# Patient Record
Sex: MALE | Race: BLACK OR AFRICAN AMERICAN | Employment: FULL TIME | URBAN - METROPOLITAN AREA
[De-identification: names, ages, dates, MRNs, and addresses within clinical notes are randomized per-mention and may not be internally consistent; named-entity substitution may affect disease eponyms.]

---

## 2017-10-16 ENCOUNTER — OFFICE VISIT (OUTPATIENT)
Dept: FAMILY MEDICINE CLINIC | Facility: CLINIC | Age: 57
End: 2017-10-16

## 2017-10-16 VITALS
BODY MASS INDEX: 27.63 KG/M2 | SYSTOLIC BLOOD PRESSURE: 120 MMHG | HEIGHT: 72 IN | WEIGHT: 204 LBS | RESPIRATION RATE: 16 BRPM | DIASTOLIC BLOOD PRESSURE: 72 MMHG | TEMPERATURE: 98 F | HEART RATE: 66 BPM

## 2017-10-16 DIAGNOSIS — Z12.5 SPECIAL SCREENING FOR MALIGNANT NEOPLASM OF PROSTATE: ICD-10-CM

## 2017-10-16 DIAGNOSIS — Z00.00 LABORATORY EXAMINATION ORDERED AS PART OF A ROUTINE GENERAL MEDICAL EXAMINATION: ICD-10-CM

## 2017-10-16 DIAGNOSIS — Z00.00 WELLNESS EXAMINATION: Primary | ICD-10-CM

## 2017-10-16 DIAGNOSIS — Z23 NEED FOR IMMUNIZATION AGAINST INFLUENZA: ICD-10-CM

## 2017-10-16 PROCEDURE — 90471 IMMUNIZATION ADMIN: CPT | Performed by: FAMILY MEDICINE

## 2017-10-16 PROCEDURE — 90686 IIV4 VACC NO PRSV 0.5 ML IM: CPT | Performed by: FAMILY MEDICINE

## 2017-10-16 PROCEDURE — 99396 PREV VISIT EST AGE 40-64: CPT | Performed by: FAMILY MEDICINE

## 2017-10-16 NOTE — PATIENT INSTRUCTIONS
Thank you for choosing Geovanny Lewis MD at [place] [address]  To Do: Panchito Chavez  1. F/U in 1 yr for next physical or as needed for any health concerns  2. High Blood pressure  What Is a Normal Blood Pressure?   [employer] o from the aorta, the large blood vessel that supplies blood to the abdomen, pelvis, and legs   Heart failure   Poor blood supply to the legs  Erectile Dysfunction  Problems with your vision    Overview  \"Blood pressure\" is the force of blood pushing again and much less sodium (salt) than the typical American diet. Limit sodium to no more than 2,300 mg a day (eating only 1,500 mg a day is an even better goal).    Reduce saturated fat to no more than 6% of daily calories and total fat to 27% of daily calories vegetables only as side dishes — a hearty blend of vegetables served over brown rice or whole-wheat noodles can serve as the main dish for a meal.   • Fresh or frozen vegetables are both good choices.  When buying frozen and canned vegetables, choose those that contains the enzyme lactase, which can reduce or prevent the symptoms of lactose intolerance. • Go easy on regular and even fat-free cheeses because they are typically high in sodium.    Lean meat, poultry and fish (6 or fewer servings a day)  Meat c natural plant compound (phytochemical) that has been shown to have some health benefits. Fats and oils (2 to 3 servings a day)  Fat helps your body absorb essential vitamins and helps your body's immune system.  But too much fat increases your risk of hea on added sugar, which has no nutritional value but can pack on calories. DASH diet: Alcohol and caffeine  Drinking too much alcohol can increase blood pressure.  The DASH diet recommends that men limit alcohol to two or fewer drinks a day and women one or taste when you choose low-sodium food and beverages.  If things seem too bland, gradually introduce low-sodium foods and cut back on table salt until you reach your sodium goal. That'll give your palate time to adjust. It can take several weeks for your tas safety, read the entire package insert of all medicines prescribed to you and be aware of all of the risks of treatment even beyond those discussed today.  All therapies have potential risk of harm or side effects or medication interactions.  It is your du

## 2017-10-16 NOTE — H&P
[address] [place] [address]    History and Physical    Juan Antonio Gobble Pako-Irasema Patient Status:  No patient class for patient encounter    1960 MRN [id number]   Location [address] [place] [address], [address] [facility] , [facility] Attending No fatigue, fever and unexpected weight change. HENT: Negative for hearing loss and sore throat. Eyes: Negative for pain and visual disturbance. Respiratory: Negative for cough, chest tightness, shortness of breath and wheezing.     Cardiovascular: Nega He has no wheezes. He has no rales. He exhibits no tenderness. Abdominal: Soft. Bowel sounds are normal. He exhibits no distension and no mass. There is no hepatosplenomegaly. There is no tenderness. There is no rebound and no guarding. No hernia.    Musc

## 2017-10-17 NOTE — PROGRESS NOTES
Vit D level mildly low. May take Vit D 1000 mcg daily. Otherwise , labs with no concerning values.  Please notify patient.    -Dr. Tc Santiago

## 2017-10-24 ENCOUNTER — OFFICE VISIT (OUTPATIENT)
Dept: FAMILY MEDICINE CLINIC | Facility: CLINIC | Age: 57
End: 2017-10-24

## 2017-10-24 VITALS
SYSTOLIC BLOOD PRESSURE: 128 MMHG | DIASTOLIC BLOOD PRESSURE: 74 MMHG | WEIGHT: 206 LBS | RESPIRATION RATE: 18 BRPM | HEART RATE: 94 BPM | TEMPERATURE: 99 F | BODY MASS INDEX: 27.9 KG/M2 | HEIGHT: 72 IN | OXYGEN SATURATION: 94 %

## 2017-10-24 DIAGNOSIS — J40 BRONCHITIS: Primary | ICD-10-CM

## 2017-10-24 PROCEDURE — 99213 OFFICE O/P EST LOW 20 MIN: CPT | Performed by: EMERGENCY MEDICINE

## 2017-10-24 RX ORDER — ALBUTEROL SULFATE 90 UG/1
1-2 AEROSOL, METERED RESPIRATORY (INHALATION) EVERY 4 HOURS PRN
Qty: 1 INHALER | Refills: 1 | Status: SHIPPED | OUTPATIENT
Start: 2017-10-24 | End: 2018-02-08 | Stop reason: ALTCHOICE

## 2017-10-24 RX ORDER — CODEINE PHOSPHATE AND GUAIFENESIN 10; 100 MG/5ML; MG/5ML
10 SOLUTION ORAL 3 TIMES DAILY PRN
Qty: 250 ML | Refills: 0 | Status: SHIPPED
Start: 2017-10-24 | End: 2018-02-08 | Stop reason: ALTCHOICE

## 2017-10-24 RX ORDER — AZITHROMYCIN 250 MG/1
TABLET, FILM COATED ORAL
Qty: 1 PACKAGE | Refills: 0 | Status: SHIPPED | OUTPATIENT
Start: 2017-10-24 | End: 2017-10-25

## 2017-10-24 NOTE — PROGRESS NOTES
Chief Complaint:   Patient presents with:  Cough: mix cough w/yellowish mucus, chills, loss of appetite. For 1 week     HPI:   This is a 61year old male     COUGH  Congh and congestion for the past 5 days. Cough dry and productive of scany phlegm.  + runny sclera non icteric bilateral  NECK: supple, no adenopathy, no thyromegaly  LUNGS: clear to auscultation, no RRW  CARDIO: RRR without murmur  EXTREMITIES: no cyanosis, clubbing or edema no calf swelling or tenderness      ASSESSMENT AND PLAN:   Diagnoses an

## 2017-10-24 NOTE — PATIENT INSTRUCTIONS
Thank you for choosing [facility]  To Do:  Lima Catherine  Follow-up with your primary care physician in the next 1 week if not better.   Go to the ER if there is any worsening of symptoms  Start antibiotics and use a albuterol inhaler

## 2017-10-25 ENCOUNTER — TELEPHONE (OUTPATIENT)
Dept: FAMILY MEDICINE CLINIC | Facility: CLINIC | Age: 57
End: 2017-10-25

## 2017-10-25 RX ORDER — AZITHROMYCIN 250 MG/1
TABLET, FILM COATED ORAL
Qty: 1 PACKAGE | Refills: 0 | Status: SHIPPED | OUTPATIENT
Start: 2017-10-25 | End: 2018-02-08 | Stop reason: ALTCHOICE

## 2017-10-25 NOTE — TELEPHONE ENCOUNTER
Spoke with pt. Pt states that he has misplaced his abt and is very concerned because it is now time for him to take his next dose. Pt states that he would like a new Rx sent to the pharmacy on file as he has no idea where the medication is.    Please advise

## 2017-10-25 NOTE — TELEPHONE ENCOUNTER
Pharmacy calling patient lost his medication for azithromycin (ZITHROMAX Z-AGNIESZKA) 250 MG Oral Tab. He is worried about missing dosage.  Please advise,

## 2018-02-08 ENCOUNTER — OFFICE VISIT (OUTPATIENT)
Dept: FAMILY MEDICINE CLINIC | Facility: CLINIC | Age: 58
End: 2018-02-08

## 2018-02-08 VITALS
SYSTOLIC BLOOD PRESSURE: 102 MMHG | HEART RATE: 63 BPM | RESPIRATION RATE: 20 BRPM | BODY MASS INDEX: 28 KG/M2 | WEIGHT: 208 LBS | DIASTOLIC BLOOD PRESSURE: 60 MMHG | OXYGEN SATURATION: 100 % | TEMPERATURE: 98 F

## 2018-02-08 DIAGNOSIS — M25.511 ACUTE PAIN OF RIGHT SHOULDER: Primary | ICD-10-CM

## 2018-02-08 PROCEDURE — 99212 OFFICE O/P EST SF 10 MIN: CPT | Performed by: PHYSICIAN ASSISTANT

## 2018-02-08 NOTE — PATIENT INSTRUCTIONS
-YOU HAVE BEEN CLEARLY ADVISED TO GO TO HIGHER LEVEL OF CARE INCLUDING ER/ED. CONCERNED FEELING FEVERISH FOR 5 DAYS AND WITH SHOULDER PAIN.   UNABLE TO WORK UP FURTHER IN WIC. YOU ARE AWARE [facility] UNABLE TO WORK UP POSSIBLE UNDERLYING HEART/LUNG CONDITIONS, U · Shoulder joints become stiff if left in a sling for too long. You should start range of motion exercises about 7 to 10 days after the injury. Talk with your provider to find out what type of exercises to do and how soon to start.   Follow-up care  Follow

## 2018-02-08 NOTE — PROGRESS NOTES
S:  Patient reports to clinic for subjective fevers and right shoulder pain. Patient reports he has felt \"feverish\" x 5 days, although has not taken his temperature at home. Patient feels feverish in the mornings and at night.   Patient denies URI s A sprain is a stretching or tearing of the ligaments that hold a joint together. A sprain may take up to 8 weeks to fully heal, depending on how severe it is. Moderate to severe shoulder sprains are treated with a sling or shoulder immobilizer.  Minor sprai Any X-rays you had today don’t show any broken bones, breaks, or fractures. Sometimes fractures don’t show up on the first X-ray. Bruises and sprains can sometimes hurt as much as a fracture. These injuries can take time to heal completely.  If your symptom

## 2018-05-08 ENCOUNTER — OFFICE VISIT (OUTPATIENT)
Dept: FAMILY MEDICINE CLINIC | Facility: CLINIC | Age: 58
End: 2018-05-08

## 2018-05-08 VITALS
BODY MASS INDEX: 27.63 KG/M2 | SYSTOLIC BLOOD PRESSURE: 118 MMHG | RESPIRATION RATE: 16 BRPM | WEIGHT: 204 LBS | DIASTOLIC BLOOD PRESSURE: 70 MMHG | HEIGHT: 72 IN | TEMPERATURE: 98 F | HEART RATE: 60 BPM

## 2018-05-08 DIAGNOSIS — J01.90 ACUTE NON-RECURRENT SINUSITIS, UNSPECIFIED LOCATION: Primary | ICD-10-CM

## 2018-05-08 PROCEDURE — 99213 OFFICE O/P EST LOW 20 MIN: CPT | Performed by: FAMILY MEDICINE

## 2018-05-08 RX ORDER — AMOXICILLIN 875 MG/1
875 TABLET, COATED ORAL 2 TIMES DAILY
Qty: 20 TABLET | Refills: 0 | Status: SHIPPED | OUTPATIENT
Start: 2018-05-08 | End: 2018-05-18

## 2018-05-08 NOTE — PATIENT INSTRUCTIONS
Thank you for choosing Connie Yarbrough MD at [place] [address]  To Do: Panchito Chavez  1. Please take meds as directed  [facility] is located in [address]. Monday, Tuesday & Friday – 8 a.m. to 4 p.m.   Wednesday, Thursday – 7 a.m. to 3 outweigh those potential risks and we strive to make you healthier and to improve your quality of life.     Referrals, and Radiology Information:    If your insurance requires a referral to a specialist, please allow 5 business days to process your referral

## 2018-05-08 NOTE — PROGRESS NOTES
HPI:    Patient ID: Olivia Ruiz is a 61year old male. HPI  Mr. Wilmer Bennett is a pleasant 62 y/o M who has been generally healthy here today for ringing of both ears after he got back from his trip from [place].   He had started symptoms from la normal. No respiratory distress. He has no wheezes. He has no rales. Abdominal: Soft. Bowel sounds are normal. He exhibits no distension. There is no tenderness. Musculoskeletal: He exhibits no edema.    Lymphadenopathy:     He has no cervical adenopath

## 2018-07-31 ENCOUNTER — OFFICE VISIT (OUTPATIENT)
Dept: FAMILY MEDICINE CLINIC | Facility: CLINIC | Age: 58
End: 2018-07-31
Payer: COMMERCIAL

## 2018-07-31 VITALS
BODY MASS INDEX: 27.5 KG/M2 | SYSTOLIC BLOOD PRESSURE: 116 MMHG | HEART RATE: 58 BPM | TEMPERATURE: 98 F | DIASTOLIC BLOOD PRESSURE: 72 MMHG | HEIGHT: 72 IN | WEIGHT: 203 LBS | RESPIRATION RATE: 16 BRPM

## 2018-07-31 DIAGNOSIS — J01.10 ACUTE NON-RECURRENT FRONTAL SINUSITIS: Primary | ICD-10-CM

## 2018-07-31 PROCEDURE — 99213 OFFICE O/P EST LOW 20 MIN: CPT | Performed by: FAMILY MEDICINE

## 2018-07-31 RX ORDER — AMOXICILLIN 500 MG/1
500 CAPSULE ORAL 3 TIMES DAILY
Qty: 30 CAPSULE | Refills: 0 | Status: SHIPPED | OUTPATIENT
Start: 2018-07-31 | End: 2018-08-10

## 2018-07-31 NOTE — PROGRESS NOTES
HPI:    Patient ID: Olivia Ruiz is a 61year old male.     HPI  Mr. Wilmer Bennett is a pleasant 60 y/o M who has been generally healthy who came back yesterday from [facility] after visiting her mother for her birthday over the weekend and has been having rig murmur heard. Pulmonary/Chest: Effort normal and breath sounds normal. No respiratory distress. He has no wheezes. He has no rales. Abdominal: Soft. Bowel sounds are normal. He exhibits no distension. There is no tenderness.    Musculoskeletal: He exhibi

## 2018-07-31 NOTE — PATIENT INSTRUCTIONS
Thank you for choosing Cheng Pineda MD at [place] [address]  To Do: Panchito Chavez  1. Please take meds as directed.,    [facility] is located in [address]. Monday, Tuesday & Friday – 8 a.m. to 4 p.m.   Wednesday, Thursday – 7 a.m. t outweigh those potential risks and we strive to make you healthier and to improve your quality of life.     Referrals, and Radiology Information:    If your insurance requires a referral to a specialist, please allow 5 business days to process your referral

## 2018-11-14 ENCOUNTER — OFFICE VISIT (OUTPATIENT)
Dept: FAMILY MEDICINE CLINIC | Facility: CLINIC | Age: 58
End: 2018-11-14
Payer: COMMERCIAL

## 2018-11-14 VITALS
RESPIRATION RATE: 18 BRPM | HEART RATE: 74 BPM | BODY MASS INDEX: 27.63 KG/M2 | WEIGHT: 204 LBS | HEIGHT: 72 IN | DIASTOLIC BLOOD PRESSURE: 80 MMHG | SYSTOLIC BLOOD PRESSURE: 110 MMHG | TEMPERATURE: 98 F

## 2018-11-14 DIAGNOSIS — J20.9 ACUTE BRONCHITIS, UNSPECIFIED ORGANISM: ICD-10-CM

## 2018-11-14 DIAGNOSIS — J01.90 ACUTE NON-RECURRENT SINUSITIS, UNSPECIFIED LOCATION: Primary | ICD-10-CM

## 2018-11-14 PROCEDURE — 99214 OFFICE O/P EST MOD 30 MIN: CPT | Performed by: FAMILY MEDICINE

## 2018-11-14 RX ORDER — AMOXICILLIN AND CLAVULANATE POTASSIUM 875; 125 MG/1; MG/1
1 TABLET, FILM COATED ORAL 2 TIMES DAILY
Qty: 20 TABLET | Refills: 0 | Status: SHIPPED | OUTPATIENT
Start: 2018-11-14 | End: 2018-12-27

## 2018-11-14 RX ORDER — METHYLPREDNISOLONE 4 MG/1
TABLET ORAL
Qty: 1 KIT | Refills: 0 | Status: SHIPPED | OUTPATIENT
Start: 2018-11-14 | End: 2018-11-20

## 2018-11-14 NOTE — PROGRESS NOTES
HPI:    Patient ID: Clara Eduardo is a 60year old male.     HPI  Mr. Garrison Ratliff is a pleasant 58-year-old male who was been generally healthy who recently had come back from [facility] and [place] for a leisure trip presenting today with cou no mass. There is no tenderness. Musculoskeletal: He exhibits no edema. Lymphadenopathy:     He has no cervical adenopathy. Neurological: He is alert. Psychiatric: He has a normal mood and affect. His behavior is normal.   Vitals reviewed.

## 2018-11-14 NOTE — PATIENT INSTRUCTIONS
Thank you for choosing Nicol Rubi MD at [place] [address]  To Do: Panchito Chavez  1. Please take meds as directed. [facility] is located in [address]. Monday, Tuesday & Friday – 8 a.m. to 4 p.m.   Wednesday, Thursday – 7 a.m. to outweigh those potential risks and we strive to make you healthier and to improve your quality of life.     Referrals, and Radiology Information:    If your insurance requires a referral to a specialist, please allow 5 business days to process your referral

## 2018-11-19 ENCOUNTER — TELEPHONE (OUTPATIENT)
Dept: FAMILY MEDICINE CLINIC | Facility: CLINIC | Age: 58
End: 2018-11-19

## 2018-11-19 NOTE — TELEPHONE ENCOUNTER
Patient will need note for work from last week and another one for this week. He is still coughing a lot and does not feel any better. Patient will come and  the notes when they are complete.

## 2018-11-19 NOTE — TELEPHONE ENCOUNTER
PT states he is still coughing a lot of flem, and he is very congested. Advised pt he should be seen to be re evaluated as he is not feeling better. Appr scheduled. Has not been to work since last Tuesday. Paula Padilla He usually works Tues-Fri.  Pt would like a no

## 2018-11-20 ENCOUNTER — OFFICE VISIT (OUTPATIENT)
Dept: FAMILY MEDICINE CLINIC | Facility: CLINIC | Age: 58
End: 2018-11-20
Payer: COMMERCIAL

## 2018-11-20 VITALS
DIASTOLIC BLOOD PRESSURE: 76 MMHG | BODY MASS INDEX: 27.63 KG/M2 | RESPIRATION RATE: 16 BRPM | SYSTOLIC BLOOD PRESSURE: 114 MMHG | HEIGHT: 72 IN | TEMPERATURE: 98 F | WEIGHT: 204 LBS | HEART RATE: 70 BPM

## 2018-11-20 DIAGNOSIS — R05.9 COUGH: Primary | ICD-10-CM

## 2018-11-20 PROCEDURE — 99213 OFFICE O/P EST LOW 20 MIN: CPT | Performed by: FAMILY MEDICINE

## 2018-11-20 RX ORDER — PROMETHAZINE HYDROCHLORIDE AND CODEINE PHOSPHATE 6.25; 1 MG/5ML; MG/5ML
2.5 SYRUP ORAL EVERY 4 HOURS PRN
Qty: 118 ML | Refills: 0 | Status: SHIPPED | OUTPATIENT
Start: 2018-11-20 | End: 2018-12-27

## 2018-11-20 NOTE — PATIENT INSTRUCTIONS
Thank you for choosing Trev Hickey MD at [facility]  To Do: Panchito Chavez  1. Please take meds as directed. [facility] is located in [address]. Monday, Tuesday & Friday – 8 a.m. to 4 p.m.   Wednesday, Thursday – 7 a.m. to 3 outweigh those potential risks and we strive to make you healthier and to improve your quality of life.     Referrals, and Radiology Information:    If your insurance requires a referral to a specialist, please allow 5 business days to process your referral

## 2018-11-20 NOTE — PROGRESS NOTES
HPI:    Patient ID: Brianne Connelly is a 60year old male. HPI  Mr. Van Davis is a pleasant 53-year-old male who I saw last week for cough secondary to acute sinusitis and bronchitis.   He had recently gone back from a trip from [facility] and Conjunctivae are normal. No scleral icterus. Neck: Neck supple. No thyromegaly present. Cardiovascular: Normal rate, regular rhythm and normal heart sounds. No murmur heard.   Pulmonary/Chest: Effort normal and breath sounds normal. No respiratory dis

## 2018-12-26 ENCOUNTER — TELEPHONE (OUTPATIENT)
Dept: FAMILY MEDICINE CLINIC | Facility: CLINIC | Age: 58
End: 2018-12-26

## 2018-12-26 NOTE — TELEPHONE ENCOUNTER
Pt states he is still having cough. Pt informed he needs to come in to get re-evaluated. appt offered for tomorrow morning with  at 745am. Pt is ok with appt.         Future Appointments   Date Time Provider Lima Cuello   12/27/2018  8:0

## 2018-12-27 ENCOUNTER — OFFICE VISIT (OUTPATIENT)
Dept: FAMILY MEDICINE CLINIC | Facility: CLINIC | Age: 58
End: 2018-12-27
Payer: COMMERCIAL

## 2018-12-27 VITALS
RESPIRATION RATE: 16 BRPM | HEIGHT: 72 IN | BODY MASS INDEX: 27.63 KG/M2 | TEMPERATURE: 100 F | WEIGHT: 204 LBS | SYSTOLIC BLOOD PRESSURE: 120 MMHG | DIASTOLIC BLOOD PRESSURE: 80 MMHG | HEART RATE: 80 BPM

## 2018-12-27 DIAGNOSIS — J01.91 ACUTE RECURRENT SINUSITIS, UNSPECIFIED LOCATION: Primary | ICD-10-CM

## 2018-12-27 PROCEDURE — 99213 OFFICE O/P EST LOW 20 MIN: CPT | Performed by: FAMILY MEDICINE

## 2018-12-27 RX ORDER — AMOXICILLIN AND CLAVULANATE POTASSIUM 875; 125 MG/1; MG/1
1 TABLET, FILM COATED ORAL 2 TIMES DAILY
Qty: 20 TABLET | Refills: 0 | Status: SHIPPED | OUTPATIENT
Start: 2018-12-27 | End: 2019-01-06

## 2018-12-27 RX ORDER — PROMETHAZINE HYDROCHLORIDE AND CODEINE PHOSPHATE 6.25; 1 MG/5ML; MG/5ML
2.5 SYRUP ORAL EVERY 4 HOURS PRN
Qty: 118 ML | Refills: 0 | Status: SHIPPED | OUTPATIENT
Start: 2018-12-27 | End: 2019-01-14 | Stop reason: ALTCHOICE

## 2018-12-27 NOTE — PROGRESS NOTES
HPI:    Patient ID: Cedrick Santos is a 60year old male.     HPI  Mr. Vishnu Greenfield is a pleasant 60year-old male  here today for cough which started about 4 days ago which is likely productive of phlegm associated with fever, headaches, congestio scleral icterus. Neck: Neck supple. No thyromegaly present. Cardiovascular: Normal rate, regular rhythm and normal heart sounds. No murmur heard. Pulmonary/Chest: Effort normal and breath sounds normal. No respiratory distress. He has no wheezes.  He

## 2018-12-27 NOTE — PATIENT INSTRUCTIONS
Thank you for choosing Valentina Martínez MD at [place] [address]  To Do: Panchito Chavez  1. Please take meds as directed. [facility] is located in [address]. Monday, Tuesday & Friday – 8 a.m. to 4 p.m.   Wednesday, Thursday – 7 a.m. to outweigh those potential risks and we strive to make you healthier and to improve your quality of life.     Referrals, and Radiology Information:    If your insurance requires a referral to a specialist, please allow 5 business days to process your referral

## 2019-01-14 ENCOUNTER — OFFICE VISIT (OUTPATIENT)
Dept: FAMILY MEDICINE CLINIC | Facility: CLINIC | Age: 59
End: 2019-01-14
Payer: COMMERCIAL

## 2019-01-14 VITALS
BODY MASS INDEX: 27.9 KG/M2 | SYSTOLIC BLOOD PRESSURE: 116 MMHG | RESPIRATION RATE: 16 BRPM | HEIGHT: 72 IN | HEART RATE: 66 BPM | TEMPERATURE: 98 F | DIASTOLIC BLOOD PRESSURE: 78 MMHG | WEIGHT: 206 LBS

## 2019-01-14 DIAGNOSIS — Z13.228 SCREENING FOR ENDOCRINE, METABOLIC AND IMMUNITY DISORDER: ICD-10-CM

## 2019-01-14 DIAGNOSIS — Z13.29 SCREENING FOR ENDOCRINE, METABOLIC AND IMMUNITY DISORDER: ICD-10-CM

## 2019-01-14 DIAGNOSIS — Z00.00 WELLNESS EXAMINATION: Primary | ICD-10-CM

## 2019-01-14 DIAGNOSIS — Z13.0 SCREENING FOR ENDOCRINE, METABOLIC AND IMMUNITY DISORDER: ICD-10-CM

## 2019-01-14 PROCEDURE — 99396 PREV VISIT EST AGE 40-64: CPT | Performed by: FAMILY MEDICINE

## 2019-01-14 NOTE — PROGRESS NOTES
Wellness Exam    REASON FOR VISIT:    Amilcar Merida is a 60year old male who presents for an [address]     Current Complaints: Mr. Elli Marquis is a pleasant 61 y/o M here for his wellness  Flu Shot: see immunization record  Healt Colonoscopy due on 12/27/2023    Flex Sigmoidoscopy Screen  Every 5 years No results found for this or any previous visit.     Fecal Occult Blood  Annually No results found for: FOB, OCCULTSTOOL    Obesity Screening Screen all adults annually Body mass inde breath. Cardiovascular: Negative for chest pain and palpitations. Gastrointestinal: Negative for nausea, vomiting, abdominal pain and diarrhea. Genitourinary: Negative for urgency, frequency and difficulty urinating.    Musculoskeletal: Negative for diagnosis)  Screening for endocrine, metabolic and immunity disorder    PLAN SUMMARY:   Evan Platt is a 60year old male  Age appropriate cancer screening, labs, safety, immunizations were discussed with the patient and ordered as follows:  Vashti older: one dose   Varicella 2 doses if not immune   MMR 1-2 doses if born after 1957 and not immune     Patient Active Problem List:     Laboratory examination ordered as part of a routine general medical examination     Prediabetes     Hyperlipidemia

## 2019-01-14 NOTE — PATIENT INSTRUCTIONS
Thank you for choosing Radha Chand MD at [place] [address]  To Do: Panchito Chavez  1. Please see age appropriate health prevention below    [email or web address] is located in [address]. Monday, Tuesday & Friday – 8 a.m. to 4 p.m.   Wednesday, that the benefits outweigh those potential risks and we strive to make you healthier and to improve your quality of life.     Referrals, and Radiology Information:    If your insurance requires a referral to a specialist, please allow 5 business days to pro exams   Blood pressure All men in this age group Yearly checkup if your blood pressure is normal  Normal blood pressure is less than 120/80 mm Hg  If your blood pressure reading is higher than normal, follow the advice of your healthcare provider      Lei this age group Ask your healthcare provider   Vaccine Who needs it How often   Chickenpox (varicella) All men in this age group who have no record of this infection or vaccine 2 doses; second dose should be given at least 4 weeks after the first dose   Hep provider At routine exams   Use of daily aspirin Men in this age group at risk for cardiovascular health problems At routine exams   Use of tobacco and the health effects it can cause All men in this age group Every visit   [facility]

## 2019-01-15 LAB
ABSOLUTE BASOPHILS: 22 CELLS/UL (ref 0–200)
ABSOLUTE EOSINOPHILS: 163 CELLS/UL (ref 15–500)
ABSOLUTE LYMPHOCYTES: 1918 CELLS/UL (ref 850–3900)
ABSOLUTE MONOCYTES: 352 CELLS/UL (ref 200–950)
ABSOLUTE NEUTROPHILS: 1945 CELLS/UL (ref 1500–7800)
ALBUMIN/GLOBULIN RATIO: 1.6 (CALC) (ref 1–2.5)
ALBUMIN: 4.3 G/DL (ref 3.6–5.1)
ALKALINE PHOSPHATASE: 62 U/L (ref 40–115)
ALT: 18 U/L (ref 9–46)
AST: 19 U/L (ref 10–35)
BASOPHILS: 0.5 %
BILIRUBIN, TOTAL: 0.6 MG/DL (ref 0.2–1.2)
BUN: 11 MG/DL (ref 7–25)
CALCIUM: 9.5 MG/DL (ref 8.6–10.3)
CARBON DIOXIDE: 30 MMOL/L (ref 20–32)
CHLORIDE: 102 MMOL/L (ref 98–110)
CHOL/HDLC RATIO: 4.9 (CALC)
CHOLESTEROL, TOTAL: 227 MG/DL
CREATININE: 1.32 MG/DL (ref 0.7–1.33)
EGFR IF AFRICN AM: 68 ML/MIN/1.73M2
EGFR IF NONAFRICN AM: 59 ML/MIN/1.73M2
EOSINOPHILS: 3.7 %
GLOBULIN: 2.7 G/DL (CALC) (ref 1.9–3.7)
GLUCOSE: 87 MG/DL (ref 65–99)
HDL CHOLESTEROL: 46 MG/DL
HEMATOCRIT: 46.7 % (ref 38.5–50)
HEMOGLOBIN A1C: 6.2 % OF TOTAL HGB
HEMOGLOBIN: 14.7 G/DL (ref 13.2–17.1)
LDL-CHOLESTEROL: 151 MG/DL (CALC)
LYMPHOCYTES: 43.6 %
MCH: 21.2 PG (ref 27–33)
MCHC: 31.5 G/DL (ref 32–36)
MCV: 67.2 FL (ref 80–100)
MONOCYTES: 8 %
MPV: 10.1 FL (ref 7.5–12.5)
NEUTROPHILS: 44.2 %
NON-HDL CHOLESTEROL: 181 MG/DL (CALC)
PLATELET COUNT: 197 THOUSAND/UL (ref 140–400)
POTASSIUM: 4.5 MMOL/L (ref 3.5–5.3)
PROTEIN, TOTAL: 7 G/DL (ref 6.1–8.1)
PSA, TOTAL: 1.3 NG/ML
RDW: 18.9 % (ref 11–15)
RED BLOOD CELL COUNT: 6.95 MILLION/UL (ref 4.2–5.8)
SODIUM: 138 MMOL/L (ref 135–146)
TRIGLYCERIDES: 163 MG/DL
TSH W/REFLEX TO FT4: 1.88 MIU/L (ref 0.4–4.5)
VITAMIN B12: 565 PG/ML (ref 200–1100)
VITAMIN D, 25-OH, TOTAL: 26 NG/ML (ref 30–100)
WHITE BLOOD CELL COUNT: 4.4 THOUSAND/UL (ref 3.8–10.8)

## 2019-01-15 NOTE — PROGRESS NOTES
LDL and total cholesterol elevated than previous. Recommend lifestyle modifications including low salt, low fat, and low carbohydrate diet.  Encourage aerobic exercises 4-5 times/week for 20-30 minutes per session    -Dr. Sandy Foley

## 2020-03-12 ENCOUNTER — TELEPHONE (OUTPATIENT)
Dept: FAMILY MEDICINE CLINIC | Facility: CLINIC | Age: 60
End: 2020-03-12

## 2020-03-12 NOTE — TELEPHONE ENCOUNTER
Patient called to schedule annual exam. Patient asked screening questions while answering he mentioned he traveled to [place], was there for 8 days returned on Monday. Patient has no symptoms at this time.  Please advise

## 2020-03-12 NOTE — TELEPHONE ENCOUNTER
Spoke with pt, advised pt since he is scheduling for his annual exam and with recent travel to wait and schedule his appt for after 14 days. Pt recently returned from [place] on 3/9/2020. Pt denies any symptoms.  Advised pt if he develops any symptoms (SOB,

## 2020-04-22 ENCOUNTER — VIRTUAL PHONE E/M (OUTPATIENT)
Dept: FAMILY MEDICINE CLINIC | Facility: CLINIC | Age: 60
End: 2020-04-22
Payer: COMMERCIAL

## 2020-04-22 DIAGNOSIS — R05.9 COUGH: Primary | ICD-10-CM

## 2020-04-22 DIAGNOSIS — Z20.822 EXPOSURE TO COVID-19 VIRUS: ICD-10-CM

## 2020-04-22 DIAGNOSIS — R50.9 FEVER, UNSPECIFIED FEVER CAUSE: ICD-10-CM

## 2020-04-22 PROCEDURE — 99213 OFFICE O/P EST LOW 20 MIN: CPT | Performed by: PHYSICIAN ASSISTANT

## 2020-04-22 NOTE — PATIENT INSTRUCTIONS
Thank you for choosing Dylan Alfaro PA-C at [facility]  To Do: Panchito Chavez  1.  Push fluids, OTC acetaminophen  What to do if you are sick with coronavirus disease 2019 (COVID-19)  If you are sick with COVID-19 or suspect you are in they feel dry. Soap and water should be used preferentially if hands are visibly dirty. Avoid sharing personal household items  You should not share dishes, drink glasses, cups, eating utensils, towels, or bedding with other people or pets in your home.  A provided by their local health department or occupational health professionals, as appropriate. If you have a medical emergency and need to call 911, notify the dispatch personnel that you have, or are being evaluated for COVID-19.  If possible, put on a f wipes according to the label instructions. Centers for Disease Control & Prevention (CDC)  What to do if you are sick with coronavirus disease 2019, [employer]pt. pdf  [facility] request.    If Lisa Saldivar PA-C orders a CT or MRI, it may take up to 10 business days to receive approval from your insurance company.      Once our office has called informing you that the insurance company approved your testing, please call Central

## 2020-04-22 NOTE — PROGRESS NOTES
Virtual Telephone Check-In    Cedrick Santos verbally consents to a Virtual/Telephone Check-In visit on 04/22/20.     Patient understands and accepts financial responsibility for any deductible, co-insurance and/or co-pays associated with this servic myalgias. Neurological: Positive for headaches. Negative for dizziness and light-headedness. There were no vitals taken for this visit. There is no height or weight on file to calculate BMI.   Physical Exam   Constitutional: He is oriented to pers facemask  You should wear a facemask when you are around other people (e.g., sharing a room or vehicle) or pets and before you enter a healthcare provider's office.  If you are not able to wear a facemask (for example, because it causes trouble breathing), including precautions you should take when applying the product, such as wearing gloves and making sure you have good ventilation during use of the product.   Monitor your symptoms  Seek prompt medical attention if your illness is worsening (e.g., difficult COVID-19.  5. For medical emergencies, call 911 and notify the dispatch personnel that you have or may have COVID-19.  6. Cover your cough and sneezes.   7. Wash your hands often with soap and water for at least 20 seconds or clean your hands with an alcoho

## 2020-04-23 ENCOUNTER — LAB ENCOUNTER (OUTPATIENT)
Dept: LAB | Facility: HOSPITAL | Age: 60
End: 2020-04-23
Attending: PHYSICIAN ASSISTANT
Payer: COMMERCIAL

## 2020-04-23 DIAGNOSIS — Z20.822 EXPOSURE TO COVID-19 VIRUS: ICD-10-CM

## 2020-04-23 DIAGNOSIS — R50.9 FEVER, UNSPECIFIED FEVER CAUSE: ICD-10-CM

## 2020-04-23 DIAGNOSIS — R05.9 COUGH: ICD-10-CM

## 2020-05-05 ENCOUNTER — VIRTUAL PHONE E/M (OUTPATIENT)
Dept: FAMILY MEDICINE CLINIC | Facility: CLINIC | Age: 60
End: 2020-05-05
Payer: COMMERCIAL

## 2020-05-05 DIAGNOSIS — R50.9 FEVER, UNSPECIFIED FEVER CAUSE: Primary | ICD-10-CM

## 2020-05-05 PROCEDURE — 99213 OFFICE O/P EST LOW 20 MIN: CPT | Performed by: PHYSICIAN ASSISTANT

## 2020-05-05 NOTE — PATIENT INSTRUCTIONS
Thank you for choosing Rosaura Vera PA-C at [place] [address]  To Do: Panchito hCavez  1. Push fluids  2.  Follow-up if symptoms persist or increase  Quarantine Advice:  • Those with mild symptoms, regardless of test results, are presumed to h you should not stay in the same room with you, or they should wear a facemask if they enter your room. Cover your coughs and sneezes  Cover your mouth and nose with a tissue when you cough or sneeze.  Throw used tissues in a lined trash can; immediately wa care, call your healthcare provider and tell them that you have, or are being evaluated for, COVID-19. Put on a facemask before you enter the facility.  These steps will help the healthcare provider's office to keep other people in the office or waiting marti hand  that contains at least 60% alcohol. • As much as possible, stay in a specific room and away from other people in your home. Also, you should use a separate bathroom, if available.  If you need to be around other people or outside of the home effects or medication interactions.  It is your duty and for your safety to discuss with the pharmacist and our office with questions, and to notify us and stop treatment if problems arise, but know that our intention is that the benefits outweigh those po

## 2020-05-05 NOTE — PROGRESS NOTES
Virtual Telephone Check-In    Mio Hartley verbally consents to a Virtual/Telephone Check-In visit on 05/05/20.     Patient understands and accepts financial responsibility for any deductible, co-insurance and/or co-pays associated with this servic Pulmonary/Chest:   No increased effort or work with breathing   Neurological: He is alert and oriented to person, place, and time.    Speaking fluently and in clear sentences         Assessment and Plan:  Fever, unspecified fever cause  (primary encounter

## 2020-11-02 ENCOUNTER — OFFICE VISIT (OUTPATIENT)
Dept: FAMILY MEDICINE CLINIC | Facility: CLINIC | Age: 60
End: 2020-11-02
Payer: COMMERCIAL

## 2020-11-02 VITALS
HEART RATE: 54 BPM | OXYGEN SATURATION: 99 % | SYSTOLIC BLOOD PRESSURE: 112 MMHG | BODY MASS INDEX: 27.9 KG/M2 | DIASTOLIC BLOOD PRESSURE: 70 MMHG | TEMPERATURE: 97 F | HEIGHT: 72 IN | WEIGHT: 206 LBS | RESPIRATION RATE: 14 BRPM

## 2020-11-02 DIAGNOSIS — Z12.5 SCREENING FOR MALIGNANT NEOPLASM OF PROSTATE: ICD-10-CM

## 2020-11-02 DIAGNOSIS — Z23 NEED FOR VACCINATION: ICD-10-CM

## 2020-11-02 DIAGNOSIS — Z00.00 WELLNESS EXAMINATION: Primary | ICD-10-CM

## 2020-11-02 PROCEDURE — 3078F DIAST BP <80 MM HG: CPT | Performed by: FAMILY MEDICINE

## 2020-11-02 PROCEDURE — 99396 PREV VISIT EST AGE 40-64: CPT | Performed by: FAMILY MEDICINE

## 2020-11-02 PROCEDURE — 3074F SYST BP LT 130 MM HG: CPT | Performed by: FAMILY MEDICINE

## 2020-11-02 PROCEDURE — 3008F BODY MASS INDEX DOCD: CPT | Performed by: FAMILY MEDICINE

## 2020-11-02 RX ORDER — ASCORBIC ACID 500 MG
500 TABLET ORAL DAILY
COMMUNITY

## 2020-11-02 NOTE — PATIENT INSTRUCTIONS
Thank you for choosing Geovanny Lewis MD at [place] [address]  To Do: Panchito Chavez  1. Please see age appropriate health prevention below    [facility] is located in [address]. Monday, Tuesday & Friday – 8 a.m. to 4 p.m.   Wednesday, that the benefits outweigh those potential risks and we strive to make you healthier and to improve your quality of life.     Referrals, and Radiology Information:    If your insurance requires a referral to a specialist, please allow 5 business days to pro age group  Yearly checkup if your blood pressure is normal   Normal blood pressure is less than 120/80 mm Hg   If your blood pressure reading is higher than normal, follow the advice of your healthcare provider    Colorectal cancer All men at average risk or persons who have quit within past 15 years, and have a 30-pack-year smoking history (Eligibility criteria may vary across major organizations; Age limit may extend to age [de-identified].)   Talk with your healthcare provider for more information.   Yearly lung cance first dose.     Haemophilus influenzae Type B (HIB)  Men at increased risk for infection – talk with your healthcare provider  1 to 3 doses   Influenza (flu) All men in this age group  Once a year   Measles, mumps, rubella (MMR)  Men in this age group throu healthcare provider  At routine exams   Daily low-dose aspirin  Men ages 36 to 79 years who are not at increased risk of bleeding and are at high risk of cardiovascular disease 4; talk with your healthcare provider  At routine exams   Use of statins Men ag

## 2020-11-02 NOTE — PROGRESS NOTES
Wellness Exam    REASON FOR VISIT:    Franklyn Barnhart is a 60year old male who presents for an [address].     Current Complaints: Mr. Son Glass is here for his wellness exam  Flu Shot: see immunization record  Health Maintenance visit. Fecal Occult Blood  Annually No results found for: FOB, OCCULTSTOOL    Obesity Screening Screen all adults annually Body mass index is 27.94 kg/m².       Preventive Services for Which Recommendations Vary with Risk Recommendation Internal Lab or P cough and shortness of breath. Cardiovascular: Negative for chest pain and palpitations. Gastrointestinal: Negative for nausea, vomiting, abdominal pain and diarrhea. Genitourinary: Negative for urgency, frequency and difficulty urinating.    Musculo Wellness examination  (primary encounter diagnosis)  Screening for malignant neoplasm of prostate  Need for vaccination    PLAN SUMMARY:   Raul Guan is a 60year old male  Age appropriate cancer screening, labs, safety, immunizations were dis Educated by: MD   The patient indicates understanding of these issues and agrees to the plan.     SUGGESTED VACCINATIONS - Influenza, Pneumococcal, Zoster, Tetanus     Immunization History   Administered Date(s) Administered   • FLUZONE 6 months and older

## 2022-04-13 ENCOUNTER — OFFICE VISIT (OUTPATIENT)
Dept: FAMILY MEDICINE CLINIC | Facility: CLINIC | Age: 62
End: 2022-04-13
Payer: COMMERCIAL

## 2022-04-13 VITALS
TEMPERATURE: 97 F | SYSTOLIC BLOOD PRESSURE: 120 MMHG | BODY MASS INDEX: 28.85 KG/M2 | HEIGHT: 72 IN | OXYGEN SATURATION: 99 % | RESPIRATION RATE: 16 BRPM | WEIGHT: 213 LBS | DIASTOLIC BLOOD PRESSURE: 72 MMHG | HEART RATE: 70 BPM

## 2022-04-13 DIAGNOSIS — Z00.00 LABORATORY EXAM ORDERED AS PART OF ROUTINE GENERAL MEDICAL EXAMINATION: ICD-10-CM

## 2022-04-13 DIAGNOSIS — R68.82 DECREASED LIBIDO: ICD-10-CM

## 2022-04-13 DIAGNOSIS — Z00.00 WELLNESS EXAMINATION: Primary | ICD-10-CM

## 2022-04-13 DIAGNOSIS — R73.03 PREDIABETES: ICD-10-CM

## 2022-04-13 PROCEDURE — 3008F BODY MASS INDEX DOCD: CPT | Performed by: FAMILY MEDICINE

## 2022-04-13 PROCEDURE — 3074F SYST BP LT 130 MM HG: CPT | Performed by: FAMILY MEDICINE

## 2022-04-13 PROCEDURE — 99396 PREV VISIT EST AGE 40-64: CPT | Performed by: FAMILY MEDICINE

## 2022-04-13 PROCEDURE — 3078F DIAST BP <80 MM HG: CPT | Performed by: FAMILY MEDICINE

## 2022-04-13 PROCEDURE — 3044F HG A1C LEVEL LT 7.0%: CPT | Performed by: NURSE PRACTITIONER

## 2022-04-13 RX ORDER — ZOSTER VACCINE RECOMBINANT, ADJUVANTED 50 MCG/0.5
1 KIT INTRAMUSCULAR ONCE
Qty: 1 EACH | Refills: 1 | Status: SHIPPED | OUTPATIENT
Start: 2022-04-13 | End: 2022-04-13

## 2022-04-14 LAB
ABSOLUTE BASOPHILS: 21 CELLS/UL (ref 0–200)
ABSOLUTE EOSINOPHILS: 80 CELLS/UL (ref 15–500)
ABSOLUTE LYMPHOCYTES: 1760 CELLS/UL (ref 850–3900)
ABSOLUTE MONOCYTES: 386 CELLS/UL (ref 200–950)
ABSOLUTE NEUTROPHILS: 1953 CELLS/UL (ref 1500–7800)
ALBUMIN/GLOBULIN RATIO: 1.3 (CALC) (ref 1–2.5)
ALBUMIN: 4 G/DL (ref 3.6–5.1)
ALKALINE PHOSPHATASE: 76 U/L (ref 35–144)
ALT: 24 U/L (ref 9–46)
AST: 20 U/L (ref 10–35)
BASOPHILS: 0.5 %
BILIRUBIN, TOTAL: 0.6 MG/DL (ref 0.2–1.2)
BUN/CREATININE RATIO: 9 (CALC) (ref 6–22)
BUN: 12 MG/DL (ref 7–25)
CALCIUM: 9.3 MG/DL (ref 8.6–10.3)
CARBON DIOXIDE: 27 MMOL/L (ref 20–32)
CHLORIDE: 104 MMOL/L (ref 98–110)
CHOL/HDLC RATIO: 5.4 (CALC)
CHOLESTEROL, TOTAL: 232 MG/DL
CREATININE: 1.27 MG/DL (ref 0.7–1.25)
EGFR IF AFRICN AM: 70 ML/MIN/1.73M2
EGFR IF NONAFRICN AM: 60 ML/MIN/1.73M2
EOSINOPHILS: 1.9 %
GLOBULIN: 3 G/DL (CALC) (ref 1.9–3.7)
GLUCOSE: 118 MG/DL (ref 65–99)
HDL CHOLESTEROL: 43 MG/DL
HEMATOCRIT: 48.2 % (ref 38.5–50)
HEMOGLOBIN A1C: 6.7 % OF TOTAL HGB
HEMOGLOBIN: 15.1 G/DL (ref 13.2–17.1)
LDL-CHOLESTEROL: 152 MG/DL (CALC)
LYMPHOCYTES: 41.9 %
MCH: 21.1 PG (ref 27–33)
MCHC: 31.3 G/DL (ref 32–36)
MCV: 67.5 FL (ref 80–100)
MONOCYTES: 9.2 %
MPV: 9.6 FL (ref 7.5–12.5)
NEUTROPHILS: 46.5 %
NON-HDL CHOLESTEROL: 189 MG/DL (CALC)
PLATELET COUNT: 184 THOUSAND/UL (ref 140–400)
POTASSIUM: 4.4 MMOL/L (ref 3.5–5.3)
PROTEIN, TOTAL: 7 G/DL (ref 6.1–8.1)
PSA, TOTAL: 2.71 NG/ML
RDW: 19 % (ref 11–15)
RED BLOOD CELL COUNT: 7.14 MILLION/UL (ref 4.2–5.8)
SODIUM: 139 MMOL/L (ref 135–146)
TRIGLYCERIDES: 227 MG/DL
TSH W/REFLEX TO FT4: 1.89 MIU/L (ref 0.4–4.5)
WHITE BLOOD CELL COUNT: 4.2 THOUSAND/UL (ref 3.8–10.8)

## 2022-06-01 ENCOUNTER — TELEMEDICINE (OUTPATIENT)
Dept: FAMILY MEDICINE CLINIC | Facility: CLINIC | Age: 62
End: 2022-06-01

## 2022-06-01 DIAGNOSIS — R05.9 COUGH: ICD-10-CM

## 2022-06-01 DIAGNOSIS — J22 LRTI (LOWER RESPIRATORY TRACT INFECTION): Primary | ICD-10-CM

## 2022-06-01 RX ORDER — AMOXICILLIN AND CLAVULANATE POTASSIUM 875; 125 MG/1; MG/1
1 TABLET, FILM COATED ORAL 2 TIMES DAILY
Qty: 20 TABLET | Refills: 0 | Status: SHIPPED | OUTPATIENT
Start: 2022-06-01 | End: 2022-06-11

## 2022-06-05 ENCOUNTER — PATIENT MESSAGE (OUTPATIENT)
Dept: FAMILY MEDICINE CLINIC | Facility: CLINIC | Age: 62
End: 2022-06-05

## 2022-06-05 NOTE — TELEPHONE ENCOUNTER
From: Juliet Chavez  To: Victor Hugo Salas MD  Sent: 6/5/2022 10:36 AM CDT  Subject: PCR RESULT    Hi Doctor,  my pcr result came in positive for covid even though the antigen was negative, also the cough is not getting any better, just wanted to let you know in case my RX needs to be changed.

## 2022-12-20 ENCOUNTER — OFFICE VISIT (OUTPATIENT)
Dept: FAMILY MEDICINE CLINIC | Facility: CLINIC | Age: 62
End: 2022-12-20
Payer: COMMERCIAL

## 2022-12-20 VITALS
SYSTOLIC BLOOD PRESSURE: 147 MMHG | HEIGHT: 72 IN | HEART RATE: 67 BPM | BODY MASS INDEX: 28.44 KG/M2 | RESPIRATION RATE: 18 BRPM | DIASTOLIC BLOOD PRESSURE: 85 MMHG | OXYGEN SATURATION: 98 % | WEIGHT: 210 LBS | TEMPERATURE: 98 F

## 2022-12-20 DIAGNOSIS — J04.0 LARYNGITIS: ICD-10-CM

## 2022-12-20 DIAGNOSIS — J11.1 INFLUENZA-LIKE ILLNESS: ICD-10-CM

## 2022-12-20 DIAGNOSIS — R05.9 COUGH, UNSPECIFIED TYPE: Primary | ICD-10-CM

## 2022-12-20 LAB
CONTROL LINE PRESENT WITH A CLEAR BACKGROUND (YES/NO): YES YES/NO
STREP GRP A CUL-SCR: NEGATIVE

## 2022-12-20 PROCEDURE — 3079F DIAST BP 80-89 MM HG: CPT | Performed by: NURSE PRACTITIONER

## 2022-12-20 PROCEDURE — 87637 SARSCOV2&INF A&B&RSV AMP PRB: CPT | Performed by: NURSE PRACTITIONER

## 2022-12-20 PROCEDURE — 87880 STREP A ASSAY W/OPTIC: CPT | Performed by: NURSE PRACTITIONER

## 2022-12-20 PROCEDURE — 3008F BODY MASS INDEX DOCD: CPT | Performed by: NURSE PRACTITIONER

## 2022-12-20 PROCEDURE — 99213 OFFICE O/P EST LOW 20 MIN: CPT | Performed by: NURSE PRACTITIONER

## 2022-12-20 PROCEDURE — 3077F SYST BP >= 140 MM HG: CPT | Performed by: NURSE PRACTITIONER

## 2022-12-20 RX ORDER — BENZONATATE 200 MG/1
200 CAPSULE ORAL 3 TIMES DAILY PRN
Qty: 30 CAPSULE | Refills: 0 | Status: SHIPPED | OUTPATIENT
Start: 2022-12-20 | End: 2022-12-30

## 2022-12-20 NOTE — PATIENT INSTRUCTIONS
Self care for viral illnesses:  May start Sambuccal Elderberry syrup or tablets as directed on package to help reduce length and severity of illness. Benzonatate perles every eight hours with large glass of water for cough as needed. This can numb the throat and turn off \"tickle\" causing cough. If not helping after the third dose you may stop the medication and use Chloraseptic spray. Salt water gargles (1 tsp. Salt in 6 oz lukewarm water), use several times daily to help decrease swelling and pain in throat  Saline nasal spray to nostrils 3-4 times daily to help soothe nasal passages and dryness. Hydrate! (cold or hot based on comfort). Drink lots of water or other non dehydrating liquids to help with illness. Cool mist or hot steam inhalation can help symptoms as well. May use humidifier in bedroom at night to help with congestion. Hot steamy showers can loosen congestion and help cough. Hand washing-use hand  or wash hands frequently, cover your cough or sneeze, do not share towels or drinks with others. May use Tylenol or Ibuprofen over the counter for pain/comfort if not contraindicated. No work or school until fever free for 24 hours and symptoms are mostly resolved. Follow up in 10-14 days after illness started with primary care if symptoms not complete resolved or sooner if worsening symptoms. Seek immediate care if inability to swallow or breathe or if symptoms improve greatly then worsen again.

## 2022-12-21 LAB
FLUAV + FLUBV RNA SPEC NAA+PROBE: NOT DETECTED
FLUAV + FLUBV RNA SPEC NAA+PROBE: NOT DETECTED
RSV RNA SPEC NAA+PROBE: NOT DETECTED
SARS-COV-2 RNA RESP QL NAA+PROBE: NOT DETECTED

## 2023-06-22 ENCOUNTER — LAB ENCOUNTER (OUTPATIENT)
Dept: LAB | Age: 63
End: 2023-06-22
Attending: FAMILY MEDICINE
Payer: COMMERCIAL

## 2023-06-22 ENCOUNTER — OFFICE VISIT (OUTPATIENT)
Dept: FAMILY MEDICINE CLINIC | Facility: CLINIC | Age: 63
End: 2023-06-22
Payer: COMMERCIAL

## 2023-06-22 VITALS
TEMPERATURE: 98 F | OXYGEN SATURATION: 98 % | SYSTOLIC BLOOD PRESSURE: 130 MMHG | WEIGHT: 213 LBS | HEART RATE: 53 BPM | HEIGHT: 72 IN | RESPIRATION RATE: 16 BRPM | BODY MASS INDEX: 28.85 KG/M2 | DIASTOLIC BLOOD PRESSURE: 70 MMHG

## 2023-06-22 DIAGNOSIS — Z00.00 WELLNESS EXAMINATION: Primary | ICD-10-CM

## 2023-06-22 DIAGNOSIS — Z00.00 LABORATORY EXAMINATION ORDERED AS PART OF A ROUTINE GENERAL MEDICAL EXAMINATION: ICD-10-CM

## 2023-06-22 DIAGNOSIS — Z12.5 SCREENING FOR PROSTATE CANCER: ICD-10-CM

## 2023-06-22 DIAGNOSIS — R73.03 PREDIABETES: ICD-10-CM

## 2023-06-22 LAB
ALBUMIN SERPL-MCNC: 3.8 G/DL (ref 3.4–5)
ALBUMIN/GLOB SERPL: 1 {RATIO} (ref 1–2)
ALP LIVER SERPL-CCNC: 73 U/L
ALT SERPL-CCNC: 37 U/L
ANION GAP SERPL CALC-SCNC: <0 MMOL/L (ref 0–18)
AST SERPL-CCNC: 24 U/L (ref 15–37)
BASOPHILS # BLD AUTO: 0.02 X10(3) UL (ref 0–0.2)
BASOPHILS NFR BLD AUTO: 0.5 %
BILIRUB SERPL-MCNC: 0.5 MG/DL (ref 0.1–2)
BUN BLD-MCNC: 13 MG/DL (ref 7–18)
CALCIUM BLD-MCNC: 9.1 MG/DL (ref 8.5–10.1)
CHLORIDE SERPL-SCNC: 108 MMOL/L (ref 98–112)
CHOLEST SERPL-MCNC: 226 MG/DL (ref ?–200)
CO2 SERPL-SCNC: 28 MMOL/L (ref 21–32)
COMPLEXED PSA SERPL-MCNC: 7.82 NG/ML (ref ?–4)
CREAT BLD-MCNC: 1.31 MG/DL
EOSINOPHIL # BLD AUTO: 0.1 X10(3) UL (ref 0–0.7)
EOSINOPHIL NFR BLD AUTO: 2.5 %
ERYTHROCYTE [DISTWIDTH] IN BLOOD BY AUTOMATED COUNT: 18.7 %
EST. AVERAGE GLUCOSE BLD GHB EST-MCNC: 143 MG/DL (ref 68–126)
FASTING PATIENT LIPID ANSWER: YES
FASTING STATUS PATIENT QL REPORTED: YES
GFR SERPLBLD BASED ON 1.73 SQ M-ARVRAT: 61 ML/MIN/1.73M2 (ref 60–?)
GLOBULIN PLAS-MCNC: 3.7 G/DL (ref 2.8–4.4)
GLUCOSE BLD-MCNC: 98 MG/DL (ref 70–99)
HBA1C MFR BLD: 6.6 % (ref ?–5.7)
HCT VFR BLD AUTO: 47.8 %
HDLC SERPL-MCNC: 56 MG/DL (ref 40–59)
HGB BLD-MCNC: 15.2 G/DL
IMM GRANULOCYTES # BLD AUTO: 0.01 X10(3) UL (ref 0–1)
IMM GRANULOCYTES NFR BLD: 0.3 %
LDLC SERPL CALC-MCNC: 144 MG/DL (ref ?–100)
LYMPHOCYTES # BLD AUTO: 1.8 X10(3) UL (ref 1–4)
LYMPHOCYTES NFR BLD AUTO: 45.8 %
MCH RBC QN AUTO: 21.2 PG (ref 26–34)
MCHC RBC AUTO-ENTMCNC: 31.8 G/DL (ref 31–37)
MCV RBC AUTO: 66.8 FL
MONOCYTES # BLD AUTO: 0.43 X10(3) UL (ref 0.1–1)
MONOCYTES NFR BLD AUTO: 10.9 %
NEUTROPHILS # BLD AUTO: 1.57 X10 (3) UL (ref 1.5–7.7)
NEUTROPHILS # BLD AUTO: 1.57 X10(3) UL (ref 1.5–7.7)
NEUTROPHILS NFR BLD AUTO: 40 %
NONHDLC SERPL-MCNC: 170 MG/DL (ref ?–130)
OSMOLALITY SERPL CALC.SUM OF ELEC: 280 MOSM/KG (ref 275–295)
PLATELET # BLD AUTO: 204 10(3)UL (ref 150–450)
POTASSIUM SERPL-SCNC: 4.4 MMOL/L (ref 3.5–5.1)
PROT SERPL-MCNC: 7.5 G/DL (ref 6.4–8.2)
RBC # BLD AUTO: 7.16 X10(6)UL
SODIUM SERPL-SCNC: 135 MMOL/L (ref 136–145)
T4 FREE SERPL-MCNC: 1 NG/DL (ref 0.8–1.7)
TRIGL SERPL-MCNC: 143 MG/DL (ref 30–149)
TSI SER-ACNC: 1.37 MIU/ML (ref 0.36–3.74)
VLDLC SERPL CALC-MCNC: 27 MG/DL (ref 0–30)
WBC # BLD AUTO: 3.9 X10(3) UL (ref 4–11)

## 2023-06-22 PROCEDURE — 83036 HEMOGLOBIN GLYCOSYLATED A1C: CPT | Performed by: FAMILY MEDICINE

## 2023-06-22 PROCEDURE — 3078F DIAST BP <80 MM HG: CPT | Performed by: FAMILY MEDICINE

## 2023-06-22 PROCEDURE — 84403 ASSAY OF TOTAL TESTOSTERONE: CPT | Performed by: FAMILY MEDICINE

## 2023-06-22 PROCEDURE — 84153 ASSAY OF PSA TOTAL: CPT | Performed by: FAMILY MEDICINE

## 2023-06-22 PROCEDURE — 80061 LIPID PANEL: CPT | Performed by: FAMILY MEDICINE

## 2023-06-22 PROCEDURE — 3075F SYST BP GE 130 - 139MM HG: CPT | Performed by: FAMILY MEDICINE

## 2023-06-22 PROCEDURE — 80050 GENERAL HEALTH PANEL: CPT | Performed by: FAMILY MEDICINE

## 2023-06-22 PROCEDURE — 99396 PREV VISIT EST AGE 40-64: CPT | Performed by: FAMILY MEDICINE

## 2023-06-22 PROCEDURE — 3008F BODY MASS INDEX DOCD: CPT | Performed by: FAMILY MEDICINE

## 2023-06-22 PROCEDURE — 84439 ASSAY OF FREE THYROXINE: CPT | Performed by: FAMILY MEDICINE

## 2023-06-22 NOTE — PATIENT INSTRUCTIONS
Thank you for choosing Gemini Sweeney MD at [place] [address]  To Do: Panchito Chavez  1. Please see age appropriate health prevention below    [facility] is located in [address]. Monday, Tuesday & Friday - 8 a.m. to 4 p.m. Wednesday, Thursday - 7 a.m. to 3 p.m. The lab is closed daily from 12 p.m.-12:30 p.m. Saturday lab hours by appointment. Call [phone] to schedule the appointment. Please signup for [employer], which is electronic access to your record if you have not done so. All your results will post on there. [email or web address]   You can NOW use [employer] to book your appointments with us, or consider using open access scheduling which is through the [facility] website [email or web address] and type in Gemini Sweeney MD and follow the links for \"Schedule Online Now\"    To schedule Imaging or tests at [facility] Scheduling [phone], Go to [facility] A ER Building (For example: CT scans, X rays, Ultrasound, MRI)  Cardiac Testing in ER building Building A second floor Cardiac Testing [phone] (For example: Holter Monitor, Cardiac Stress tests,Event Monitor, or 2D Echocardiograms)  [facility] call [phone] usually in [facility] A  Walk in Clinic in [place] at [facility] Mon-Fri at 8am-7:30 p.m., and Sat/Sun 9:00a. m.-4:30 p.m. Also at [address]  Call [phone] for info     Please call our office about any questions regarding your treatment/medicines/tests as a result of today's visit. For your safety, read the entire package insert of all medicines prescribed to you and be aware of all of the risks of treatment even beyond those discussed today. All therapies have potential risk of harm or side effects or medication interactions.   It is your duty and for your safety to discuss with the pharmacist and our office with questions, and to notify us and stop treatment if problems arise, but know that our intention is that the benefits outweigh those potential risks and we strive to make you healthier and to improve your quality of life. Referrals, and Radiology Information:    If your insurance requires a referral to a specialist, please allow 5 business days to process your referral request.    If Dewayne Frederick MD orders a CT or MRI, it may take up to 10 business days to receive approval from your insurance company. Once our office has called informing you that the insurance company approved your testing, please call Central Scheduling at [phone]  Please allow our office 5 business days to contact you regarding any testing results. Refill policies:   Allow 3 business days for refills; controlled substances may take longer and must be picked up from the office in person. Narcotic medications can only be filled in 30 day increments and must be refilled at an office visit only. If your prescription is due for a refill, you may be due for a follow-up appointment. We cannot refill your maintenance medications at a preventative wellness visit. To best provide you care, patients receiving maintenance medications need to be seen at least twice a year.

## 2023-06-26 DIAGNOSIS — R97.20 ELEVATED PSA: ICD-10-CM

## 2023-06-26 DIAGNOSIS — R73.09 ELEVATED HEMOGLOBIN A1C: Primary | ICD-10-CM

## 2023-06-26 LAB
FREE TESTOST DIRECT: 7.6 PG/ML
TESTOSTERONE: 554 NG/DL

## 2023-06-26 RX ORDER — METFORMIN HYDROCHLORIDE 500 MG/1
500 TABLET, EXTENDED RELEASE ORAL
Qty: 90 TABLET | Refills: 0 | Status: SHIPPED | OUTPATIENT
Start: 2023-06-26

## 2023-06-26 RX ORDER — METFORMIN HYDROCHLORIDE 500 MG/1
500 TABLET, EXTENDED RELEASE ORAL DAILY
Qty: 30 TABLET | Refills: 0 | Status: SHIPPED | OUTPATIENT
Start: 2023-06-26 | End: 2023-06-26

## 2023-06-27 ENCOUNTER — TELEPHONE (OUTPATIENT)
Dept: FAMILY MEDICINE CLINIC | Facility: CLINIC | Age: 63
End: 2023-06-27

## 2023-09-18 ENCOUNTER — LAB ENCOUNTER (OUTPATIENT)
Dept: LAB | Age: 63
End: 2023-09-18
Attending: UROLOGY
Payer: COMMERCIAL

## 2023-09-18 ENCOUNTER — OFFICE VISIT (OUTPATIENT)
Dept: SURGERY | Facility: CLINIC | Age: 63
End: 2023-09-18

## 2023-09-18 DIAGNOSIS — N13.8 BPH WITH OBSTRUCTION/LOWER URINARY TRACT SYMPTOMS: ICD-10-CM

## 2023-09-18 DIAGNOSIS — R97.20 ELEVATED PSA: Primary | ICD-10-CM

## 2023-09-18 DIAGNOSIS — N40.1 BPH WITH OBSTRUCTION/LOWER URINARY TRACT SYMPTOMS: ICD-10-CM

## 2023-09-18 DIAGNOSIS — N52.9 ERECTILE DYSFUNCTION, UNSPECIFIED ERECTILE DYSFUNCTION TYPE: ICD-10-CM

## 2023-09-18 LAB
APPEARANCE: CLEAR
BILIRUBIN: NEGATIVE
GLUCOSE (URINE DIPSTICK): NEGATIVE MG/DL
KETONES (URINE DIPSTICK): NEGATIVE MG/DL
LEUKOCYTES: NEGATIVE
MULTISTIX LOT#: NORMAL NUMERIC
NITRITE, URINE: NEGATIVE
OCCULT BLOOD: NEGATIVE
PH, URINE: 7 (ref 4.5–8)
PROTEIN (URINE DIPSTICK): NEGATIVE MG/DL
SPECIFIC GRAVITY: 1.01 (ref 1–1.03)
URINE-COLOR: YELLOW
UROBILINOGEN,SEMI-QN: 0.2 MG/DL (ref 0–1.9)

## 2023-09-18 PROCEDURE — 36415 COLL VENOUS BLD VENIPUNCTURE: CPT

## 2023-09-18 PROCEDURE — 51798 US URINE CAPACITY MEASURE: CPT | Performed by: UROLOGY

## 2023-09-18 PROCEDURE — 81003 URINALYSIS AUTO W/O SCOPE: CPT | Performed by: UROLOGY

## 2023-09-18 PROCEDURE — 99244 OFF/OP CNSLTJ NEW/EST MOD 40: CPT | Performed by: UROLOGY

## 2023-09-22 ENCOUNTER — TELEPHONE (OUTPATIENT)
Dept: SURGERY | Facility: CLINIC | Age: 63
End: 2023-09-22

## 2023-09-22 DIAGNOSIS — R97.20 ELEVATED PSA: Primary | ICD-10-CM

## 2023-09-22 DIAGNOSIS — N52.9 ERECTILE DYSFUNCTION, UNSPECIFIED ERECTILE DYSFUNCTION TYPE: Primary | ICD-10-CM

## 2023-09-22 RX ORDER — SILDENAFIL 100 MG/1
100 TABLET, FILM COATED ORAL
Qty: 30 TABLET | Refills: 5 | Status: SHIPPED | OUTPATIENT
Start: 2023-09-22 | End: 2023-09-22

## 2023-09-22 RX ORDER — TADALAFIL 20 MG/1
20 TABLET ORAL
Qty: 30 TABLET | Refills: 5 | Status: SHIPPED | OUTPATIENT
Start: 2023-09-22 | End: 2023-09-22

## 2023-09-22 RX ORDER — SILDENAFIL 100 MG/1
100 TABLET, FILM COATED ORAL
Qty: 30 TABLET | Refills: 5 | Status: SHIPPED | OUTPATIENT
Start: 2023-09-22

## 2023-09-22 RX ORDER — TADALAFIL 20 MG/1
20 TABLET ORAL
Qty: 30 TABLET | Refills: 5 | Status: SHIPPED | OUTPATIENT
Start: 2023-09-22

## 2023-09-22 NOTE — TELEPHONE ENCOUNTER
4K 12.8%, PSA 6.91. Spoke to patient and he wants to check pMRI as next step - was open to my recommendation.

## 2023-09-22 NOTE — TELEPHONE ENCOUNTER
Patient states he was 09/18 and believes doctor was to send medication to pharmacy and nothing has been sent.  Please advise

## 2024-05-20 ENCOUNTER — TELEPHONE (OUTPATIENT)
Dept: FAMILY MEDICINE CLINIC | Facility: CLINIC | Age: 64
End: 2024-05-20

## 2024-05-20 NOTE — TELEPHONE ENCOUNTER
Appointment Request From: Panchito Villalpando      With Provider: Gagandeep Espinosa [-Primary Care Physician-]      Preferred Date Range: Any date 5/19/2024 or later      Preferred Times: Any      Reason: To address the following health maintenance concerns.   Dtap,Tdap,And Td Vaccines   Covid-19 Vaccine   Colorectal Cancer Screening   Annual Physical      Comments:   will like to do all the above if possible     Future Appointments   Date Time Provider Department Center   7/8/2024  9:30 AM Gagandeep Espinosa MD [facility] [address] [facility] [address]     Annual physical scheduled.

## 2024-05-20 NOTE — TELEPHONE ENCOUNTER
Can be discussed at physical   Future Appointments   Date Time Provider Department Center   7/8/2024  9:30 AM Gagandeep Espinosa MD [facility] [address] [facility] [address]

## 2024-07-08 ENCOUNTER — OFFICE VISIT (OUTPATIENT)
Dept: FAMILY MEDICINE CLINIC | Facility: CLINIC | Age: 64
End: 2024-07-08
Payer: COMMERCIAL

## 2024-07-08 VITALS
HEART RATE: 64 BPM | WEIGHT: 204 LBS | SYSTOLIC BLOOD PRESSURE: 134 MMHG | OXYGEN SATURATION: 98 % | BODY MASS INDEX: 27.63 KG/M2 | DIASTOLIC BLOOD PRESSURE: 72 MMHG | RESPIRATION RATE: 16 BRPM | TEMPERATURE: 98 F | HEIGHT: 72 IN

## 2024-07-08 DIAGNOSIS — Z00.00 WELLNESS EXAMINATION: Primary | ICD-10-CM

## 2024-07-08 DIAGNOSIS — Z12.5 SCREENING FOR PROSTATE CANCER: ICD-10-CM

## 2024-07-08 DIAGNOSIS — Z12.11 SCREENING FOR COLON CANCER: ICD-10-CM

## 2024-07-08 NOTE — PATIENT INSTRUCTIONS
Thank you for choosing Gagandeep Espinosa MD at [facility]  To Do: Panchito Villalpando  1. Please see age appropriate health prevention below     Call [phone] to schedule the appointment.   Please signup for [employer], which is electronic access to your record if you have not done so.  All your results will post on there.  [email or web address]   You can NOW use [employer] to book your appointments with us, or consider using open access scheduling which is through the [place] website [email or web address] and type in Gagandeep Espinosa MD and follow the links for \"Schedule Online Now\"    To schedule Imaging or tests at [place] call Central Scheduling [phone], Go to [facility] A ER Building (For example: CT scans, X rays, Ultrasound, MRI)  Cardiac Testing in ER building Building A second floor Cardiac Testing [phone] (For example: Holter Monitor, Cardiac Stress tests,Event Monitor, or 2D Echocardiograms)  [facility] call [phone] usually in [facility] A  Walk in Clinic in [place] at [address] Mon-Fri at 8am-7:30 p.m., and Sat/Sun 9:00a.m.-4:30 p.m.  Also at [address] [place] [address]  Call [phone] for info     Please call our office about any questions regarding your treatment/medicines/tests as a result of today's visit.  For your safety, read the entire package insert of all medicines prescribed to you and be aware of all of the risks of treatment even beyond those discussed today.  All therapies have potential risk of harm or side effects or medication interactions.  It is your duty and for your safety to discuss with the pharmacist and our office with questions, and to notify us and stop treatment if problems arise, but know that our intention is that the benefits outweigh those potential risks and we strive to make you healthier and to improve your quality of life.    Referrals, and Radiology Information:    If your insurance requires a referral to a specialist,  please allow 5 business days to process your referral request.    If Gagandeep Espinosa MD orders a CT or MRI, it may take up to 10 business days to receive approval from your insurance company. Once our office has called informing you that the insurance company approved your testing, please call Central Scheduling at [phone]  Please allow our office 5 business days to contact you regarding any testing results.    Refill policies:   Allow 3 business days for refills; controlled substances may take longer and must be picked up from the office in person.  Narcotic medications can only be filled in 30 day increments and must be refilled at an office visit only.  If your prescription is due for a refill, you may be due for a follow-up appointment.  We cannot refill your maintenance medications at a preventative wellness visit.  To best provide you care, patients receiving maintenance medications need to be seen at least twice a year.

## 2024-07-08 NOTE — PROGRESS NOTES
Wellness Exam    REASON FOR VISIT:    Panchito Villalpando is a 63 year old male who presents for an Annual Health Assessment.    Current Complaints: Mr. Pako Villalpando is here for his wellness exam  Flu Shot: see immunization record  Health Maintenance Topics with due status: Overdue       Topic Date Due    Zoster Vaccines Never done    DTaP,Tdap,and Td Vaccines 08/10/2020    COVID-19 Vaccine 09/01/2023    Colorectal Cancer Screening 12/27/2023    Annual Depression Screening 01/01/2024    Annual Physical 06/22/2024     Health Maintenance Topics with due status: Due On       Topic Date Due    PSA 06/22/2024     Reported Health:   He is a very pleasant 63-year-old gentleman with history of hyperlipidemia and prediabetes and BPH here today for his wellness exam.  He does have any concerns or complaints but he was prescribed medications for symptoms related to BPH by urologist previously but had side effects with it.  He is not taking those anymore.  Otherwise he does not have any concerns or complaints. I had reviewed past medical and family histories together with allergy and medication lists documented.    Details about the complaints:  N/A    General Health     How would you describe your current health state?: Good    Type of Diet: Balanced    How do you maintain positive mental well-being?: Social Interaction    How would you describe your daily physical activity?: Moderate    If you are a male age 45-79 or a female age 55-79, do you take aspirin?: No    Have you had any immunizations at another office such as Influenza, Hepatitis B, Tetanus, or Pneumococcal?: No    At any time do you feel concerned for the safety/well-being of yourself and/or your children, in your home or elsewhere?: No     CAGE:     Cut: Have you ever felt you should Cut down on your drinking?: No    Annoyed: Have people Annoyed you by criticizing your drinking?: No    Guilty: Have you ever felt bad or Guilty about your drinking?: No    Eye  Opener: Have you ever had a drink first thing in the morning to steady your nerves or to get rid of a hangover (Eye opener)?: No    Scoring  Total Score: 0     PHQ-4: Over the LAST 2 WEEKS       Depression Screening (PHQ-2/PHQ-9): Over the LAST 2 WEEKS   Little interest or pleasure in doing things (over the last two weeks)?: Not at all    Feeling down, depressed, or hopeless (over the last two weeks)?: Not at all    PHQ-2 SCORE: 0              PREVENTATIVE SERVICES  INDICATIONS AND SCHEDULE Recommendation Internal Lab or Procedure External Lab or Procedure   Colonoscopy Screen Every 10 years Health Maintenance   Topic Date Due    Colorectal Cancer Screening  12/27/2023       Flex Sigmoidoscopy Screen  Every 5 years No results found for this or any previous visit.    Fecal Occult Blood  Annually No results found for: \"FOB\", \"OCCULTSTOOL\"    Obesity Screening Screen all adults annually Body mass index is 27.67 kg/m².      Preventive Services for Which Recommendations Vary with Risk Recommendation Internal Lab or Procedure External Lab or Procedure   Cholesterol Screening Recommended screening varies with age, risk and gender LDL Cholesterol (mg/dL)   Date Value   06/22/2023 144 (H)     LDL-CHOLESTEROL (mg/dL (calc))   Date Value   04/13/2022 152 (H)       Diabetes Screening  If history of high blood pressure or other  risk factors HEMOGLOBIN A1c (% of total Hgb)   Date Value   04/13/2022 6.7 (H)     HgbA1C (%)   Date Value   06/22/2023 6.6 (H)     Glucose (mg/dL)   Date Value   06/22/2023 98     GLUCOSE (mg/dL)   Date Value   04/13/2022 118 (H)         Gonorrhea Screening If high risk No results found for: \"GONOCOCCUS\"    HIV Screening For all adults age 18-65, older adults at increased risk No results found for: \"HIV\"    Syphilis Screening Screen if pregnant or high risk No results found for: \"RPR\"    Hepatitis C Screening Screen those at high risk plus screen one time for adults born 1946-1 9[birth year] No results found for:  \"HCVAB\"    Tuberculosis Screen If high risk No components found for: \"PPDINDURAT\"      ALLERGIES:   No Known Allergies    CURRENT MEDICATIONS:   Current Outpatient Medications   Medication Sig Dispense Refill    metFORMIN  MG Oral Tablet 24 Hr Take 1 tablet (500 mg total) by mouth daily with breakfast. 90 tablet 0    Vitamin C 500 MG Oral Tab Take 1 tablet (500 mg total) by mouth daily.        MEDICAL INFORMATION:   No past medical history on file.   No past surgical history on file.   Family History   Problem Relation Age of Onset    Hypertension Other         Family History of     Stroke Other         Family History of       SOCIAL HISTORY:   Social History     Socioeconomic History    Marital status:    Tobacco Use    Smoking status: Never    Smokeless tobacco: Never   Substance and Sexual Activity    Alcohol use: No     Alcohol/week: 0.0 standard drinks of alcohol    Drug use: No   Other Topics Concern    Caffeine Concern Yes     Comment: tea    Exercise Yes          REVIEW OF SYSTEMS:   Constitutional: Negative for fever, chills and fatigue.   HENT: Negative for hearing loss, congestion, sore throat and neck pain.    Eyes: Negative for pain and visual disturbance.   Respiratory: Negative for cough and shortness of breath.    Cardiovascular: Negative for chest pain and palpitations.   Gastrointestinal: Negative for nausea, vomiting, abdominal pain and diarrhea.   Genitourinary: Negative for urgency, frequency and difficulty urinating.   Musculoskeletal: Negative for arthralgias and no gait problem.   Skin: Negative for color change and rash.   Neurological: Negative for tremors, weakness and numbness.   Hematological: Negative for adenopathy. Does not bruise/bleed easily.   Psychiatric/Behavioral: Negative for confusion and agitation. The patient is not nervous/anxious.      EXAM:   /72   Pulse 64   Temp 98 °F (36.7 °C) (Temporal)   Resp 16   Ht 6' (1.829 m)   Wt 204 lb (92.5 kg)   SpO2  98%   BMI 27.67 kg/m²   Constitutional: He appears well-developed, nourished, and his stated age. Vital signs reviewed  Pleasant man   Head: Normocephalic and atraumatic.   Ear: TMs visible and normal bilaterally  Nose: Nose normal.   Eyes: EOM are normal. Pupils are equal, round, and reactive to light. No scleral icterus.   Neck: Normal range of motion. No thyromegaly present.   Cardiovascular: Normal rate, regular rhythm and normal heart sounds.  Exam reveals no friction rub.    No murmur heard.  Pulmonary/Chest: Effort normal and breath sounds normal. He has no wheezes. He has no rales.   Abdominal: Soft. Bowel sounds are normal. There is no tenderness.   Musculoskeletal: Normal range of motion. He exhibits no edema.   Lymphadenopathy:     He has no cervical adenopathy.   Neurological: He is alert and oriented to person, place, and time. He has normal reflexes.   Skin: Skin is warm. No rash noted. No erythema. with normal hair  Psychiatric: He has a normal mood and affect. His behavior is normal.     ASSESSMENT AND OTHER RELEVANT CHRONIC CONDITIONS:   Panchito Villalpando is a 63 year old male who presents for an Annual Health Assessment.   1. Wellness examination    2. Screening for prostate cancer    3. Screening for colon cancer        PLAN SUMMARY:   Panchito Villalpando is a 63 year old male  Age appropriate cancer screening, labs, safety, immunizations were discussed with the patient and ordered as follows:  Diagnoses and all orders for this visit:    Wellness examination  -     CBC W Differential W Platelet [E]; Future  -     Comp Metabolic Panel (14) [E]; Future  -     Lipid Panel [E]; Future  -     TSH and Free T4 [E]; Future  -     Hemoglobin A1C [E]; Future    Screening for prostate cancer  -     PSA, Total (Screening) [E]; Future    Screening for colon cancer  -     Gastro Referral - In Network      Will check routine labs and notify him once we get test results provide him recommendations if  necessary.  Overall I do not have any concerns with his health.  Follow-up in 1 year or as needed    This note was prepared using Dragon Medical voice recognition dictation software. As a result errors may occur. When identified these errors have been corrected. While every attempt is made to correct errors during dictation discrepancies may still exist          Orders Placed This Encounter   Procedures    CBC W Differential W Platelet [E]    Comp Metabolic Panel (14) [E]    Lipid Panel [E]    TSH and Free T4 [E]    Hemoglobin A1C [E]    PSA, Total (Screening) [E]       Imaging & Consults:  GASTRO - INTERNAL    His 5 year prevention plan includes: annual visits for fasting labs  Health Maintenance   Topic Date Due    Zoster Vaccines (1 of 2) Never done    DTaP,Tdap,and Td Vaccines (2 - Td or Tdap) 08/10/2020    COVID-19 Vaccine (3 - 2023-24 season) 09/01/2023    Colorectal Cancer Screening  12/27/2023    Annual Depression Screening  01/01/2024    Annual Physical  06/22/2024    PSA  06/22/2024    Influenza Vaccine (1) 10/01/2024    Pneumococcal Vaccine: Birth to 64yrs  Aged Out     Patient/Caregiver Education:  Patient/Caregiver Education: There are no barriers to learning. Medical education done.  Outcome: Patient verbalizes understanding.    Educated by: MD   The patient indicates understanding of these issues and agrees to the plan.    SUGGESTED VACCINATIONS - Influenza, Pneumococcal, Zoster, Tetanus     Immunization History   Administered Date(s) Administered    Covid-19 Vaccine South (J&J) 0.5ml 03/10/2021, 12/01/2021    FLUZONE 6 months and older PFS 0.5 ml (13502) 10/16/2017, 11/16/2022    TDAP 08/10/2010       Influenza Annually   Pneumococcal if high risk   Td/Tdap once then every 10 years   HPV Males 11-21   Zoster (Shingles) 60 and older: one dose   Varicella 2 doses if not immune   MMR 1-2 doses if born after 1957 and not immune     Patient Active Problem List   Diagnosis    Laboratory examination  ordered as part of a routine general medical examination    Prediabetes    Hyperlipidemia    Wellness examination     PREVENTIVE VISIT,EST,40-03

## 2024-07-09 LAB
ABSOLUTE BASOPHILS: 31 CELLS/UL (ref 0–200)
ABSOLUTE EOSINOPHILS: 90 CELLS/UL (ref 15–500)
ABSOLUTE LYMPHOCYTES: 1564 CELLS/UL (ref 850–3900)
ABSOLUTE MONOCYTES: 320 CELLS/UL (ref 200–950)
ABSOLUTE NEUTROPHILS: 1895 CELLS/UL (ref 1500–7800)
ALBUMIN/GLOBULIN RATIO: 1.4 (CALC) (ref 1–2.5)
ALBUMIN: 4.2 G/DL (ref 3.6–5.1)
ALKALINE PHOSPHATASE: 66 U/L (ref 35–144)
ALT: 19 U/L (ref 9–46)
AST: 22 U/L (ref 10–35)
BASOPHILS: 0.8 %
BILIRUBIN, TOTAL: 0.6 MG/DL (ref 0.2–1.2)
BUN: 11 MG/DL (ref 7–25)
CALCIUM: 9.4 MG/DL (ref 8.6–10.3)
CARBON DIOXIDE: 25 MMOL/L (ref 20–32)
CHLORIDE: 105 MMOL/L (ref 98–110)
CHOL/HDLC RATIO: 3.7 (CALC)
CHOLESTEROL, TOTAL: 195 MG/DL
CREATININE: 1.31 MG/DL (ref 0.7–1.35)
EGFR: 61 ML/MIN/1.73M2
EOSINOPHILS: 2.3 %
GLOBULIN: 3.1 G/DL (CALC) (ref 1.9–3.7)
GLUCOSE: 99 MG/DL (ref 65–99)
HDL CHOLESTEROL: 53 MG/DL
HEMATOCRIT: 49.7 % (ref 38.5–50)
HEMOGLOBIN A1C: 6.2 % OF TOTAL HGB
HEMOGLOBIN: 15 G/DL (ref 13.2–17.1)
LDL-CHOLESTEROL: 119 MG/DL (CALC)
LYMPHOCYTES: 40.1 %
MCH: 20.6 PG (ref 27–33)
MCHC: 30.2 G/DL (ref 32–36)
MCV: 68.3 FL (ref 80–100)
MONOCYTES: 8.2 %
MPV: 9.7 FL (ref 7.5–12.5)
NEUTROPHILS: 48.6 %
NON-HDL CHOLESTEROL: 142 MG/DL (CALC)
PLATELET COUNT: 198 THOUSAND/UL (ref 140–400)
POTASSIUM: 4.7 MMOL/L (ref 3.5–5.3)
PROTEIN, TOTAL: 7.3 G/DL (ref 6.1–8.1)
PSA, TOTAL: 4.58 NG/ML
RDW: 19.5 % (ref 11–15)
RED BLOOD CELL COUNT: 7.28 MILLION/UL (ref 4.2–5.8)
SODIUM: 138 MMOL/L (ref 135–146)
T4, FREE: 1 NG/DL (ref 0.8–1.8)
TRIGLYCERIDES: 119 MG/DL
TSH: 1.17 MIU/L (ref 0.4–4.5)
WHITE BLOOD CELL COUNT: 3.9 THOUSAND/UL (ref 3.8–10.8)

## 2024-08-20 ENCOUNTER — OFFICE VISIT (OUTPATIENT)
Dept: FAMILY MEDICINE CLINIC | Facility: CLINIC | Age: 64
End: 2024-08-20
Payer: COMMERCIAL

## 2024-08-20 VITALS
HEIGHT: 72 IN | WEIGHT: 202 LBS | SYSTOLIC BLOOD PRESSURE: 118 MMHG | BODY MASS INDEX: 27.36 KG/M2 | OXYGEN SATURATION: 98 % | DIASTOLIC BLOOD PRESSURE: 70 MMHG | RESPIRATION RATE: 16 BRPM | HEART RATE: 60 BPM | TEMPERATURE: 100 F

## 2024-08-20 DIAGNOSIS — Z12.11 SCREENING FOR COLON CANCER: ICD-10-CM

## 2024-08-20 DIAGNOSIS — R05.9 COUGH, UNSPECIFIED TYPE: Primary | ICD-10-CM

## 2024-08-20 DIAGNOSIS — R30.0 BURNING WITH URINATION: ICD-10-CM

## 2024-08-20 LAB
BILIRUBIN: NEGATIVE
GLUCOSE (URINE DIPSTICK): NEGATIVE MG/DL
KETONES (URINE DIPSTICK): NEGATIVE MG/DL
MULTISTIX LOT#: ABNORMAL NUMERIC
NITRITE, URINE: NEGATIVE
PH, URINE: 6.5 (ref 4.5–8)
PROTEIN (URINE DIPSTICK): 30 MG/DL
SPECIFIC GRAVITY: 1.01 (ref 1–1.03)
URINE-COLOR: YELLOW
UROBILINOGEN,SEMI-QN: 1 MG/DL (ref 0–1.9)

## 2024-08-20 PROCEDURE — 81003 URINALYSIS AUTO W/O SCOPE: CPT | Performed by: FAMILY MEDICINE

## 2024-08-20 PROCEDURE — 87077 CULTURE AEROBIC IDENTIFY: CPT | Performed by: FAMILY MEDICINE

## 2024-08-20 PROCEDURE — 99214 OFFICE O/P EST MOD 30 MIN: CPT | Performed by: FAMILY MEDICINE

## 2024-08-20 PROCEDURE — 87086 URINE CULTURE/COLONY COUNT: CPT | Performed by: FAMILY MEDICINE

## 2024-08-20 PROCEDURE — 87186 SC STD MICRODIL/AGAR DIL: CPT | Performed by: FAMILY MEDICINE

## 2024-08-20 PROCEDURE — 87637 SARSCOV2&INF A&B&RSV AMP PRB: CPT | Performed by: FAMILY MEDICINE

## 2024-08-20 PROCEDURE — 3074F SYST BP LT 130 MM HG: CPT | Performed by: FAMILY MEDICINE

## 2024-08-20 PROCEDURE — 3008F BODY MASS INDEX DOCD: CPT | Performed by: FAMILY MEDICINE

## 2024-08-20 PROCEDURE — 3078F DIAST BP <80 MM HG: CPT | Performed by: FAMILY MEDICINE

## 2024-08-20 RX ORDER — SULFAMETHOXAZOLE/TRIMETHOPRIM 800-160 MG
1 TABLET ORAL 2 TIMES DAILY
Qty: 20 TABLET | Refills: 0 | Status: SHIPPED | OUTPATIENT
Start: 2024-08-20 | End: 2024-08-30

## 2024-08-20 NOTE — PATIENT INSTRUCTIONS
Thank you for choosing Gagandeep Espinosa MD at [facility]  To Do: Panchito Villalpando  1. Please see below   Call [phone] to schedule the appointment.   Please signup for [employer], which is electronic access to your record if you have not done so.  All your results will post on there.  [email or web address]   You can NOW use [employer] to book your appointments with us, or consider using open access scheduling which is through the [place] website [email or web address] and type in Gagandeep Espinosa MD and follow the links for \"Schedule Online Now\"    To schedule Imaging or tests at [place] call Central Scheduling [phone], Go to [facility] A ER Building (For example: CT scans, X rays, Ultrasound, MRI)  Cardiac Testing in ER building Building A second floor Cardiac Testing [phone] (For example: Holter Monitor, Cardiac Stress tests,Event Monitor, or 2D Echocardiograms)  [facility] call [phone] usually in [facility] A  Walk in Clinic in [place] at [address] Mon-Fri at 8am-7:30 p.m., and Sat/Sun 9:00a.m.-4:30 p.m.  Also at [address] [place] [address]  Call [phone] for info     Please call our office about any questions regarding your treatment/medicines/tests as a result of today's visit.  For your safety, read the entire package insert of all medicines prescribed to you and be aware of all of the risks of treatment even beyond those discussed today.  All therapies have potential risk of harm or side effects or medication interactions.  It is your duty and for your safety to discuss with the pharmacist and our office with questions, and to notify us and stop treatment if problems arise, but know that our intention is that the benefits outweigh those potential risks and we strive to make you healthier and to improve your quality of life.    Referrals, and Radiology Information:    If your insurance requires a referral to a specialist, please allow 5 business days to  process your referral request.    If Gagandeep Espinosa MD orders a CT or MRI, it may take up to 10 business days to receive approval from your insurance company. Once our office has called informing you that the insurance company approved your testing, please call Central Scheduling at [phone]  Please allow our office 5 business days to contact you regarding any testing results.    Refill policies:   Allow 3 business days for refills; controlled substances may take longer and must be picked up from the office in person.  Narcotic medications can only be filled in 30 day increments and must be refilled at an office visit only.  If your prescription is due for a refill, you may be due for a follow-up appointment.  We cannot refill your maintenance medications at a preventative wellness visit.  To best provide you care, patients receiving maintenance medications need to be seen at least twice a year.

## 2024-08-20 NOTE — PROGRESS NOTES
Subjective:   Patient ID: Panchito Villalpando is a 63 year old male.    HPI  Mr Pako Villalpando is a very pleasant 63-year-old gentleman with history of hyperlipidemia and prediabetes presenting today with his wife for fever associate with cough congestion fatigue body aches and chills since last Thursday.  He and his wife recently got back from [place].  He was feeling well during the trip.  However a few days after he had developed the symptoms.  He also has had burning when he urinates.  He has taken ibuprofen for body aches but is not feeling better.  No sick contacts at home.  No chest pain no shortness of breath no nausea no vomiting no abdominal pain no blood in the urine or stool. I had reviewed past medical and family histories together with allergy and medication lists documented.    History/Other:   Review of Systems   HENT:  Positive for congestion and sore throat. Negative for trouble swallowing.    Cardiovascular:  Negative for chest pain, palpitations and leg swelling.   Musculoskeletal:  Positive for myalgias.   Neurological:  Positive for weakness and headaches. Negative for dizziness.     Current Outpatient Medications   Medication Sig Dispense Refill    sulfamethoxazole-trimethoprim -160 MG Oral Tab per tablet Take 1 tablet by mouth 2 (two) times daily for 10 days. 20 tablet 0    Vitamin C 500 MG Oral Tab Take 1 tablet (500 mg total) by mouth daily.      metFORMIN  MG Oral Tablet 24 Hr Take 1 tablet (500 mg total) by mouth daily with breakfast. (Patient not taking: Reported on 8/20/2024) 90 tablet 0     Allergies:No Known Allergies    Objective:   Physical Exam  Vitals reviewed.   Constitutional:       General: He is not in acute distress.  HENT:      Head: Normocephalic.      Right Ear: Tympanic membrane, ear canal and external ear normal.      Left Ear: Tympanic membrane, ear canal and external ear normal.      Nose: Congestion present. No rhinorrhea.      Mouth/Throat:      Mouth:  Mucous membranes are moist.      Pharynx: Oropharynx is clear. Posterior oropharyngeal erythema present. No oropharyngeal exudate.   Eyes:      General: No scleral icterus.        Right eye: No discharge.         Left eye: No discharge.      Conjunctiva/sclera: Conjunctivae normal.   Cardiovascular:      Rate and Rhythm: Normal rate.      Heart sounds: Normal heart sounds. No murmur heard.  Pulmonary:      Effort: Pulmonary effort is normal. No respiratory distress.      Breath sounds: Normal breath sounds. No wheezing or rales.   Abdominal:      General: Bowel sounds are normal. There is no distension.      Palpations: Abdomen is soft. There is no mass.      Tenderness: There is no abdominal tenderness. There is no guarding or rebound.   Musculoskeletal:         General: Normal range of motion.      Cervical back: Neck supple.   Lymphadenopathy:      Cervical: No cervical adenopathy.   Skin:     General: Skin is warm and dry.   Neurological:      Mental Status: He is alert.   Psychiatric:         Mood and Affect: Mood normal.         Behavior: Behavior normal.         Assessment & Plan:   1. Cough, unspecified type   -Likely viral  - Will check for COVID-19 and influenza  - Keep hydrated with fluid and lites  - Quarantine for now  - Letter for work given today  - Will provide with recommendations once we get test results   2. Burning with urination   -UA dip shows moderate amount of blood and leukocytes  - Will submit for urine culture  - We will treat with Bactrim DS   3. Screening for colon cancer    Go to the emergency room with respiratory distress and/or severe dehydration      This note was prepared using Dragon Medical voice recognition dictation software. As a result errors may occur. When identified these errors have been corrected. While every attempt is made to correct errors during dictation discrepancies may still exist            Orders Placed This Encounter   Procedures    URINALYSIS, AUTO, W/O SCOPE     SARS-CoV-2/Flu A and B/RSV by PCR ([facility]) [E]    Urine Culture, Routine       Meds This Visit:  Requested Prescriptions     Signed Prescriptions Disp Refills    sulfamethoxazole-trimethoprim -160 MG Oral Tab per tablet 20 tablet 0     Sig: Take 1 tablet by mouth 2 (two) times daily for 10 days.       Imaging & Referrals:  [facility] - INTERNAL

## 2024-09-02 ENCOUNTER — OFFICE VISIT (OUTPATIENT)
Dept: FAMILY MEDICINE CLINIC | Facility: CLINIC | Age: 64
End: 2024-09-02
Payer: COMMERCIAL

## 2024-09-02 VITALS
SYSTOLIC BLOOD PRESSURE: 122 MMHG | OXYGEN SATURATION: 97 % | WEIGHT: 202 LBS | TEMPERATURE: 98 F | BODY MASS INDEX: 27.36 KG/M2 | RESPIRATION RATE: 16 BRPM | DIASTOLIC BLOOD PRESSURE: 74 MMHG | HEART RATE: 82 BPM | HEIGHT: 72 IN

## 2024-09-02 DIAGNOSIS — J06.9 VIRAL URI WITH COUGH: Primary | ICD-10-CM

## 2024-09-02 DIAGNOSIS — Z20.822 SUSPECTED COVID-19 VIRUS INFECTION: ICD-10-CM

## 2024-09-02 LAB
OPERATOR ID: NORMAL
RAPID SARS-COV-2 BY PCR: NOT DETECTED

## 2024-09-02 RX ORDER — BENZONATATE 200 MG/1
200 CAPSULE ORAL 3 TIMES DAILY PRN
Qty: 30 CAPSULE | Refills: 0 | Status: SHIPPED | OUTPATIENT
Start: 2024-09-02

## 2024-09-02 NOTE — PROGRESS NOTES
CHIEF COMPLAINT:     Chief Complaint   Patient presents with    Cough     Dry cough for 3 days.  No improvement with OTC meds.         HPI:   Panchito Villalpando is a 63 year old male who presents for upper respiratory symptoms for 3 days.  Reports predominately NP cough associated with mild nasal congestion and very slight sore throat . (+) chills 2 days ago, resolved yesterday.   Chest sore with cough, otherwise denies CP, no SOB/BEYER, no wheezing, no hemoptysis, no n/v/d, no post tussive emesis.   Cough persisting, predominately NP, paroxysmal at times.   Delsym with some relief, but insufficient.   Colleagues at work with cough.     Pt reported 3 weeks ago from [place] vacation with URI/UTI symptoms, evaluated by PCP.  Viral panel negative, UA c/w UTI, urine cx grew out pan-sensitive EColi  Tx with 10 day course of Bactrim with resolution of Urinary symptoms.  Reports URI sx had been resolved for 4 days off OTC cough/cold meds prior to recurring.     Denies h/o asthma or tobacco use.       Current Outpatient Medications   Medication Sig Dispense Refill    benzonatate 200 MG Oral Cap Take 1 capsule (200 mg total) by mouth 3 (three) times daily as needed. 30 capsule 0    metFORMIN  MG Oral Tablet 24 Hr Take 1 tablet (500 mg total) by mouth daily with breakfast. (Patient not taking: Reported on 8/20/2024) 90 tablet 0    Vitamin C 500 MG Oral Tab Take 1 tablet (500 mg total) by mouth daily.        No past medical history on file.   No past surgical history on file.      Social History     Socioeconomic History    Marital status:    Tobacco Use    Smoking status: Never    Smokeless tobacco: Never   Substance and Sexual Activity    Alcohol use: No     Alcohol/week: 0.0 standard drinks of alcohol    Drug use: No   Other Topics Concern    Caffeine Concern Yes     Comment: tea    Exercise Yes         REVIEW OF SYSTEMS:   GENERAL: normal appetite  SKIN: no rashes or abnormal skin lesions  HEENT: See  HPI  LUNGS: See HPI  CARDIOVASCULAR: denies chest pain or palpitations   GI: denies N/V/C or abdominal pain      EXAM:   /74   Pulse 82   Temp 98.2 °F (36.8 °C) (Oral)   Resp 16   Ht 6' (1.829 m)   Wt 202 lb (91.6 kg)   SpO2 97%   BMI 27.40 kg/m²   GENERAL: well developed, well nourished,in no apparent distress  SKIN: no rashes,no suspicious lesions  HEAD: atraumatic, normocephalic.  no tenderness on palpation of sinuses  EYES: conjunctiva clear, EOM intact  EARS: maryana tragi nontender, maryana EACs partially obscured with cerumen, R superior TM visualized and pearly gray, no mastoid ttp.   NOSE: Nostrils patent, clear nasal discharge, nasal mucosa erythematous/edematous   THROAT: Oral mucosa pink, moist. Posterior pharynx is non erythematous. without exudates, no hypertrophy  NECK: Supple, non-tender, no stridor  LUNGS: clear to auscultation bilaterally, no wheezes or rhonchi. Breathing is non labored.  CARDIO: RRR without murmur  EXTREMITIES: no cyanosis, clubbing or edema  LYMPH:  no lymphadenopathy.      Recent Results (from the past 24 hour(s))   Rapid Covid-19    Collection Time: 09/02/24 10:15 AM    Specimen: Nares   Result Value Ref Range    Rapid SARS-CoV-2 by PCR      POCT Lot Number [id number]     POCT Expiration Date 2/28/2026     POCT Procedure Control Control Valid Control Valid     ,179            ASSESSMENT AND PLAN:   Panchito Villalpando is a 63 year old male who presents with upper respiratory symptoms that are consistent with    ASSESSMENT:   Encounter Diagnoses   Name Primary?    Suspected COVID-19 virus infection     Viral URI with cough Yes       PLAN:    COVID-19 negative.     Viral etiology with expected course/duration reviewed.  Sx management discussed, benzonatate Rx per epic.  Use and s/e reviewed.  Discussed f/u with PCP if symptoms fail to improve/resolve as anticipated or to IC/ED with new/worsening symptoms at any time due to need for further work up beyond  diagnostic capabilities of [facility] (imaging).   Meds as below.  Comfort care as described in Patient Instructions    Meds & Refills for this Visit:  Requested Prescriptions     Signed Prescriptions Disp Refills    benzonatate 200 MG Oral Cap 30 capsule 0     Sig: Take 1 capsule (200 mg total) by mouth 3 (three) times daily as needed.     Risks, benefits, and side effects of medication explained and discussed.    The patient indicates understanding of these issues and agrees to the plan.  The patient is asked to f/u with PCP if sx's persist or worsen.  Patient Instructions    Benzonatate 200 mg up to three times daily as needed for cough.   Encourage fluids, humidifier/vaporizor at bedside, elevate head of bed (sleep with extra pillow), vapor rub to chest, steam therapy if no fever, warm compresses for sinus pressure if no fever, salt water gargles for sore throat, lozenges for sore throat, may try over the counter saline nasal spray or irrigation kit (use distilled water with irrigation kit) for sinus pressure/congestion, get plenty of rest.      Follow up with your primary care provider if your symptoms fail to improve and resolve as anticipated    Go to the Immediate Care or Emergency Department in event of new or worsening symptoms at any time     Malou Jacobo PA-C

## 2024-09-02 NOTE — PATIENT INSTRUCTIONS
Benzonatate 200 mg up to three times daily as needed for cough.   Encourage fluids, humidifier/vaporizor at bedside, elevate head of bed (sleep with extra pillow), vapor rub to chest, steam therapy if no fever, warm compresses for sinus pressure if no fever, salt water gargles for sore throat, lozenges for sore throat, may try over the counter saline nasal spray or irrigation kit (use distilled water with irrigation kit) for sinus pressure/congestion, get plenty of rest.      Follow up with your primary care provider if your symptoms fail to improve and resolve as anticipated    Go to the Immediate Care or Emergency Department in event of new or worsening symptoms at any time

## 2024-09-19 ENCOUNTER — HOSPITAL ENCOUNTER (OUTPATIENT)
Dept: GENERAL RADIOLOGY | Age: 64
Discharge: HOME | End: 2024-09-19
Attending: FAMILY MEDICINE
Payer: COMMERCIAL

## 2024-09-19 ENCOUNTER — OFFICE VISIT (OUTPATIENT)
Dept: FAMILY MEDICINE CLINIC | Facility: CLINIC | Age: 64
End: 2024-09-19
Payer: COMMERCIAL

## 2024-09-19 VITALS
HEIGHT: 72 IN | SYSTOLIC BLOOD PRESSURE: 128 MMHG | HEART RATE: 54 BPM | BODY MASS INDEX: 27.22 KG/M2 | OXYGEN SATURATION: 98 % | WEIGHT: 201 LBS | DIASTOLIC BLOOD PRESSURE: 80 MMHG | TEMPERATURE: 99 F | RESPIRATION RATE: 18 BRPM

## 2024-09-19 DIAGNOSIS — R05.3 CHRONIC COUGH: ICD-10-CM

## 2024-09-19 DIAGNOSIS — R05.3 CHRONIC COUGH: Primary | ICD-10-CM

## 2024-09-19 PROCEDURE — 3079F DIAST BP 80-89 MM HG: CPT | Performed by: FAMILY MEDICINE

## 2024-09-19 PROCEDURE — G2211 COMPLEX E/M VISIT ADD ON: HCPCS | Performed by: FAMILY MEDICINE

## 2024-09-19 PROCEDURE — 3008F BODY MASS INDEX DOCD: CPT | Performed by: FAMILY MEDICINE

## 2024-09-19 PROCEDURE — 3074F SYST BP LT 130 MM HG: CPT | Performed by: FAMILY MEDICINE

## 2024-09-19 PROCEDURE — 99213 OFFICE O/P EST LOW 20 MIN: CPT | Performed by: FAMILY MEDICINE

## 2024-09-19 PROCEDURE — 71046 X-RAY EXAM CHEST 2 VIEWS: CPT | Performed by: FAMILY MEDICINE

## 2024-09-19 RX ORDER — CODEINE PHOSPHATE AND GUAIFENESIN 10; 100 MG/5ML; MG/5ML
5 SOLUTION ORAL EVERY 6 HOURS PRN
Qty: 118 ML | Refills: 0 | Status: SHIPPED | OUTPATIENT
Start: 2024-09-19

## 2024-09-19 NOTE — PROGRESS NOTES
Subjective:   Patient ID: Panchito Villalpando is a 63 year old male.    HPI  Mr Pako Villalpando is a very pleasant 63-year-old gentleman with history of hyperlipidemia and prediabetes presenting today for cough which started a few weeks ago.  Cough was associated with congestion and sore throat.  No chest pain no shortness of breath no nausea no vomiting no abdominal pain.  No fever but he has been experiencing chills.  This prompted him to go to the emergency room.  COVID-19 test was negative.  He was prescribed Tessalon Perles but did not help. I had reviewed past medical and family histories together with allergy and medication lists documented.    History/Other:   Review of Systems   Constitutional:  Negative for fatigue and fever.   HENT:  Negative for sore throat and trouble swallowing.    Respiratory:  Negative for shortness of breath.    Cardiovascular:  Negative for chest pain and palpitations.   Gastrointestinal:  Negative for abdominal pain, diarrhea and nausea.     Current Outpatient Medications   Medication Sig Dispense Refill    benzonatate 200 MG Oral Cap Take 1 capsule (200 mg total) by mouth 3 (three) times daily as needed. 30 capsule 0    Vitamin C 500 MG Oral Tab Take 1 tablet (500 mg total) by mouth daily.      metFORMIN  MG Oral Tablet 24 Hr Take 1 tablet (500 mg total) by mouth daily with breakfast. (Patient not taking: Reported on 8/20/2024) 90 tablet 0     Allergies:No Known Allergies    Objective:   Physical Exam  Vitals reviewed.   Constitutional:       General: He is not in acute distress.  HENT:      Right Ear: External ear normal. There is impacted cerumen.      Left Ear: External ear normal. There is impacted cerumen.      Nose: Nose normal.      Mouth/Throat:      Mouth: Mucous membranes are moist.      Pharynx: Oropharynx is clear.   Eyes:      Conjunctiva/sclera: Conjunctivae normal.   Cardiovascular:      Rate and Rhythm: Normal rate and regular rhythm.      Heart sounds:  Normal heart sounds. No murmur heard.  Pulmonary:      Effort: Pulmonary effort is normal. No respiratory distress.      Breath sounds: Normal breath sounds. No wheezing or rales.   Abdominal:      General: Bowel sounds are normal. There is no distension.      Palpations: Abdomen is soft.      Tenderness: There is no abdominal tenderness.   Musculoskeletal:      Cervical back: Neck supple.      Right lower leg: No edema.      Left lower leg: No edema.   Lymphadenopathy:      Cervical: No cervical adenopathy.   Skin:     General: Skin is warm.   Neurological:      General: No focal deficit present.      Mental Status: He is alert.   Psychiatric:         Mood and Affect: Mood normal.         Behavior: Behavior normal.         Thought Content: Thought content normal.         Assessment & Plan:   1. Chronic cough    -Likely postinfectious  - Assured him that this will get better in the next few days to few weeks  - Will order chest x-ray and will notify once we get test results and will provide recommendations  - Will prescribe with guaifenesin with codeine to be taken as needed for cough suppression but had advised him not to use this when he drives and/or operate heavy machinery  - Call or come in sooner if symptoms worsen or persist    This note was prepared using Dragon Medical voice recognition dictation software. As a result errors may occur. When identified these errors have been corrected. While every attempt is made to correct errors during dictation discrepancies may still exist            No orders of the defined types were placed in this encounter.      Meds This Visit:  Requested Prescriptions      No prescriptions requested or ordered in this encounter       Imaging & Referrals:  None

## 2024-09-19 NOTE — PATIENT INSTRUCTIONS
Thank you for choosing Gagandeep Espinosa MD at [facility]  To Do: Panchito Villalpando  1. Please see below   Call [phone] to schedule the appointment.   Please signup for [email or web address], which is electronic access to your record if you have not done so.  All your results will post on there.  [email or web address]   You can NOW use [email or web address] to book your appointments with us, or consider using open access scheduling which is through the [facility] website [email or web address] and type in Gagandeep Espinosa MD and follow the links for \"Schedule Online Now\"    To schedule Imaging or tests at [facility] call Central Scheduling [phone], Go to [facility] ER Building (For example: CT scans, X rays, Ultrasound, MRI)  Cardiac Testing in ER building Building A second floor Cardiac Testing [phone] (For example: Holter Monitor, Cardiac Stress tests,Event Monitor, or 2D Echocardiograms)  [facility] call [phone] usually in [facility]  Walk in Clinic in [place] at [address] Mon-Fri at 8am-7:30 p.m., and Sat/Sun 9:00a.m.-4:30 p.m.  Also at [address] [place] [address]  Call [phone] for info     Please call our office about any questions regarding your treatment/medicines/tests as a result of today's visit.  For your safety, read the entire package insert of all medicines prescribed to you and be aware of all of the risks of treatment even beyond those discussed today.  All therapies have potential risk of harm or side effects or medication interactions.  It is your duty and for your safety to discuss with the pharmacist and our office with questions, and to notify us and stop treatment if problems arise, but know that our intention is that the benefits outweigh those potential risks and we strive to make you healthier and to improve your quality of life.    Referrals, and Radiology Information:    If your insurance requires a referral to a specialist, please allow 5 business days to  process your referral request.    If Gagandeep Espinosa MD orders a CT or MRI, it may take up to 10 business days to receive approval from your insurance company. Once our office has called informing you that the insurance company approved your testing, please call Central Scheduling at [phone]  Please allow our office 5 business days to contact you regarding any testing results.    Refill policies:   Allow 3 business days for refills; controlled substances may take longer and must be picked up from the office in person.  Narcotic medications can only be filled in 30 day increments and must be refilled at an office visit only.  If your prescription is due for a refill, you may be due for a follow-up appointment.  We cannot refill your maintenance medications at a preventative wellness visit.  To best provide you care, patients receiving maintenance medications need to be seen at least twice a year.

## 2025-01-20 PROBLEM — Z12.11 SPECIAL SCREENING FOR MALIGNANT NEOPLASM OF COLON: Status: ACTIVE | Noted: 2025-01-20

## 2025-01-20 PROBLEM — D12.5 BENIGN NEOPLASM OF SIGMOID COLON: Status: ACTIVE | Noted: 2025-01-20

## 2025-08-13 ENCOUNTER — TELEPHONE (OUTPATIENT)
Dept: FAMILY MEDICINE CLINIC | Facility: CLINIC | Age: 65
End: 2025-08-13

## 2025-08-13 ENCOUNTER — OFFICE VISIT (OUTPATIENT)
Dept: FAMILY MEDICINE CLINIC | Facility: CLINIC | Age: 65
End: 2025-08-13

## 2025-08-13 VITALS
SYSTOLIC BLOOD PRESSURE: 126 MMHG | RESPIRATION RATE: 16 BRPM | DIASTOLIC BLOOD PRESSURE: 70 MMHG | TEMPERATURE: 98 F | WEIGHT: 207 LBS | HEIGHT: 72 IN | OXYGEN SATURATION: 98 % | HEART RATE: 74 BPM | BODY MASS INDEX: 28.04 KG/M2

## 2025-08-13 DIAGNOSIS — Z00.00 LABORATORY EXAMINATION ORDERED AS PART OF A ROUTINE GENERAL MEDICAL EXAMINATION: Primary | ICD-10-CM

## 2025-08-13 DIAGNOSIS — Z00.00 WELLNESS EXAMINATION: Primary | ICD-10-CM

## 2025-08-13 DIAGNOSIS — E78.5 HYPERLIPIDEMIA, UNSPECIFIED HYPERLIPIDEMIA TYPE: ICD-10-CM

## 2025-08-13 DIAGNOSIS — R73.03 PREDIABETES: ICD-10-CM

## 2025-08-13 DIAGNOSIS — Z12.11 SPECIAL SCREENING FOR MALIGNANT NEOPLASM OF COLON: ICD-10-CM

## 2025-08-13 DIAGNOSIS — Z12.5 SCREENING FOR PROSTATE CANCER: ICD-10-CM

## 2025-08-14 ENCOUNTER — RESULTS FOLLOW-UP (OUTPATIENT)
Dept: FAMILY MEDICINE CLINIC | Facility: CLINIC | Age: 65
End: 2025-08-14

## 2025-08-14 DIAGNOSIS — Z87.898 HISTORY OF ELEVATED PSA: ICD-10-CM

## 2025-08-14 DIAGNOSIS — R97.20 ELEVATED PSA: Primary | ICD-10-CM

## 2025-08-14 LAB
ABSOLUTE BASOPHILS: 10 CELLS/UL (ref 0–200)
ABSOLUTE EOSINOPHILS: 89 CELLS/UL (ref 15–500)
ABSOLUTE LYMPHOCYTES: 1370 CELLS/UL (ref 850–3900)
ABSOLUTE MONOCYTES: 373 CELLS/UL (ref 200–950)
ABSOLUTE NEUTROPHILS: 1459 CELLS/UL (ref 1500–7800)
ALBUMIN/GLOBULIN RATIO: 1.5 (CALC) (ref 1–2.5)
ALBUMIN: 4.3 G/DL (ref 3.6–5.1)
ALKALINE PHOSPHATASE: 66 U/L (ref 35–144)
ALT: 21 U/L (ref 9–46)
AST: 25 U/L (ref 10–35)
BASOPHILS: 0.3 %
BILIRUBIN, TOTAL: 0.6 MG/DL (ref 0.2–1.2)
BUN: 12 MG/DL (ref 7–25)
CALCIUM: 9.4 MG/DL (ref 8.6–10.3)
CARBON DIOXIDE: 27 MMOL/L (ref 20–32)
CHLORIDE: 104 MMOL/L (ref 98–110)
CHOL/HDLC RATIO: 3.4 (CALC)
CHOLESTEROL, TOTAL: 189 MG/DL
CREATININE: 1.25 MG/DL (ref 0.7–1.35)
EGFR: 64 ML/MIN/1.73M2
EOSINOPHILS: 2.7 %
GLOBULIN: 2.8 G/DL (CALC) (ref 1.9–3.7)
GLUCOSE: 94 MG/DL (ref 65–99)
HDL CHOLESTEROL: 55 MG/DL
HEMATOCRIT: 47.6 % (ref 38.5–50)
HEMOGLOBIN A1C: 6.1 %
HEMOGLOBIN: 14.6 G/DL (ref 13.2–17.1)
LDL-CHOLESTEROL: 117 MG/DL (CALC)
LYMPHOCYTES: 41.5 %
MCH: 21.3 PG (ref 27–33)
MCHC: 30.7 G/DL (ref 32–36)
MCV: 69.6 FL (ref 80–100)
MONOCYTES: 11.3 %
MPV: 10.3 FL (ref 7.5–12.5)
NEUTROPHILS: 44.2 %
NON-HDL CHOLESTEROL: 134 MG/DL (CALC)
PLATELET COUNT: 204 THOUSAND/UL (ref 140–400)
POTASSIUM: 4.8 MMOL/L (ref 3.5–5.3)
PROTEIN, TOTAL: 7.1 G/DL (ref 6.1–8.1)
PSA, TOTAL: 4.09 NG/ML
RDW: 18.8 % (ref 11–15)
RED BLOOD CELL COUNT: 6.84 MILLION/UL (ref 4.2–5.8)
SODIUM: 139 MMOL/L (ref 135–146)
T4, FREE: 1.1 NG/DL (ref 0.8–1.8)
TRIGLYCERIDES: 78 MG/DL
TSH: 1.35 MIU/L (ref 0.4–4.5)
WHITE BLOOD CELL COUNT: 3.3 THOUSAND/UL (ref 3.8–10.8)

## (undated) DIAGNOSIS — E11.9 TYPE 2 DIABETES MELLITUS WITHOUT COMPLICATION, WITHOUT LONG-TERM CURRENT USE OF INSULIN (HCC): Primary | ICD-10-CM

## (undated) NOTE — LETTER
Date: 5/5/2020    Patient Name: Sherrill Urrutia          To Whom it may concern: This letter has been written at the patient's request. The above patient was seen at the [facility] for treatment of a medical condition.     The patient

## (undated) NOTE — LETTER
Date: 6/1/2022    Patient Name: Eugenia Buck          To Whom it may concern: This letter has been written at the patient's request. The above patient was seen at the [facility] for treatment of a medical condition. This patient should be excused from attending work from 5/31/22 through 6/6/22    The patient may return to work on 6/7/22 with the following limitations none.         Sincerely,    Connor Wolf MD

## (undated) NOTE — LETTER
Date: 12/20/2022    Patient Name: Shar Richter          To Whom it may concern: This letter has been written at the patient's request. The above patient was seen at the [facility] for treatment of a medical condition. This patient should be excused from attending work until fever free for 24 hours with no fever reducer and respiratory symptoms are mostly resolved per patient report. The patient is expected to return to work on or around 12/24/22 with no limitations.       Sincerely,        TOBIAS Moreira

## (undated) NOTE — LETTER
Date: 8/20/2024    Patient Name: Panchito Villalpando          To Whom it may concern:    This letter has been written at the patient's request. The above patient was seen at [facility] for treatment of a medical condition.    This patient should be excused from attending work from 8/20 through 8/26.    The patient may return to work on 8/27 with the following limitations none.        Sincerely,    Gagandeep Espinosa MD

## (undated) NOTE — LETTER
Date: 11/20/2018    Patient Name: Kenya Dunn          To Whom it may concern: This letter has been written at the patient's request. The above patient was seen at the [facility] for treatment of a medical condition.     This peggy

## (undated) NOTE — LETTER
Date: 11/14/2018    Patient Name: Leana Eubanks          To Whom it may concern: This letter has been written at the patient's request. The above patient was seen at the [facility] for treatment of a medical condition.     This peggy

## (undated) NOTE — LETTER
Date: 5/8/2018    Patient Name: Darren Kaufman          To Whom it may concern: This letter has been written at the patient's request. The above patient was seen at the [facility] for treatment of a medical condition.     This patien

## (undated) NOTE — LETTER
April 22, 2020    Patient: Amilcar Lucas   YOB: 1960     Dear Employer,  At [place] [address], we are taking special precautions and doing everything we can to prevent the spread of COVID-19 (coronavirus disease 2019).  Cheyanne immunosuppressed status due to disease or medication, chronic respiratory or heart conditions and diabetes). ?  During the social distancing period imposed by the [place] in March, any employee who can be isolated at home and avoid exposure to th

## (undated) NOTE — LETTER
Date: 7/31/2018    Patient Name: Mio Hartley          To Whom it may concern: This letter has been written at the patient's request. The above patient was seen at the [facility] for treatment of a medical condition.     This patie